# Patient Record
Sex: FEMALE | Race: WHITE | NOT HISPANIC OR LATINO | Employment: OTHER | URBAN - METROPOLITAN AREA
[De-identification: names, ages, dates, MRNs, and addresses within clinical notes are randomized per-mention and may not be internally consistent; named-entity substitution may affect disease eponyms.]

---

## 2021-01-15 ENCOUNTER — IMMUNIZATION (OUTPATIENT)
Dept: INTERNAL MEDICINE | Facility: CLINIC | Age: 76
End: 2021-01-15
Payer: OTHER GOVERNMENT

## 2021-01-15 DIAGNOSIS — Z23 NEED FOR VACCINATION: Primary | ICD-10-CM

## 2021-01-15 PROCEDURE — 91300 COVID-19, MRNA, LNP-S, PF, 30 MCG/0.3 ML DOSE VACCINE: CPT | Mod: ,,, | Performed by: FAMILY MEDICINE

## 2021-01-15 PROCEDURE — 0001A COVID-19, MRNA, LNP-S, PF, 30 MCG/0.3 ML DOSE VACCINE: ICD-10-PCS | Mod: CV19,,, | Performed by: FAMILY MEDICINE

## 2021-01-15 PROCEDURE — 0001A COVID-19, MRNA, LNP-S, PF, 30 MCG/0.3 ML DOSE VACCINE: CPT | Mod: CV19,,, | Performed by: FAMILY MEDICINE

## 2021-01-15 PROCEDURE — 91300 COVID-19, MRNA, LNP-S, PF, 30 MCG/0.3 ML DOSE VACCINE: ICD-10-PCS | Mod: ,,, | Performed by: FAMILY MEDICINE

## 2021-02-03 ENCOUNTER — TELEPHONE (OUTPATIENT)
Dept: FAMILY MEDICINE | Facility: CLINIC | Age: 76
End: 2021-02-03

## 2021-02-05 ENCOUNTER — IMMUNIZATION (OUTPATIENT)
Dept: INTERNAL MEDICINE | Facility: CLINIC | Age: 76
End: 2021-02-05
Payer: OTHER GOVERNMENT

## 2021-02-05 DIAGNOSIS — Z23 NEED FOR VACCINATION: Primary | ICD-10-CM

## 2021-02-05 PROCEDURE — 91300 COVID-19, MRNA, LNP-S, PF, 30 MCG/0.3 ML DOSE VACCINE: CPT | Mod: PBBFAC,PO

## 2021-02-05 PROCEDURE — 0002A COVID-19, MRNA, LNP-S, PF, 30 MCG/0.3 ML DOSE VACCINE: CPT | Mod: PBBFAC,PO

## 2021-08-23 ENCOUNTER — IMMUNIZATION (OUTPATIENT)
Dept: INTERNAL MEDICINE | Facility: CLINIC | Age: 76
End: 2021-08-23
Payer: OTHER GOVERNMENT

## 2021-08-23 DIAGNOSIS — Z23 NEED FOR VACCINATION: Primary | ICD-10-CM

## 2021-08-23 PROCEDURE — 91300 COVID-19, MRNA, LNP-S, PF, 30 MCG/0.3 ML DOSE VACCINE: CPT | Mod: PBBFAC

## 2021-08-23 PROCEDURE — 0003A COVID-19, MRNA, LNP-S, PF, 30 MCG/0.3 ML DOSE VACCINE: CPT | Mod: PBBFAC

## 2023-05-26 ENCOUNTER — OFFICE VISIT (OUTPATIENT)
Dept: CARDIOLOGY | Facility: CLINIC | Age: 78
End: 2023-05-26
Payer: COMMERCIAL

## 2023-05-26 ENCOUNTER — TELEPHONE (OUTPATIENT)
Dept: CARDIOLOGY | Facility: HOSPITAL | Age: 78
End: 2023-05-26
Payer: COMMERCIAL

## 2023-05-26 VITALS
BODY MASS INDEX: 21.81 KG/M2 | HEIGHT: 70 IN | SYSTOLIC BLOOD PRESSURE: 126 MMHG | DIASTOLIC BLOOD PRESSURE: 75 MMHG | HEART RATE: 75 BPM | WEIGHT: 152.31 LBS

## 2023-05-26 DIAGNOSIS — I50.22 CHRONIC SYSTOLIC CONGESTIVE HEART FAILURE: Primary | ICD-10-CM

## 2023-05-26 DIAGNOSIS — E78.00 PURE HYPERCHOLESTEROLEMIA: ICD-10-CM

## 2023-05-26 DIAGNOSIS — Z95.0 CARDIAC RESYNCHRONIZATION THERAPY PACEMAKER (CRT-P) IN PLACE: ICD-10-CM

## 2023-05-26 DIAGNOSIS — I12.9 BENIGN HYPERTENSION WITH CHRONIC KIDNEY DISEASE: ICD-10-CM

## 2023-05-26 DIAGNOSIS — I45.9 HEART BLOCK: ICD-10-CM

## 2023-05-26 DIAGNOSIS — Z95.0 CARDIAC PACEMAKER IN SITU: Primary | ICD-10-CM

## 2023-05-26 DIAGNOSIS — I25.10 CORONARY ARTERY DISEASE INVOLVING NATIVE CORONARY ARTERY OF NATIVE HEART WITHOUT ANGINA PECTORIS: ICD-10-CM

## 2023-05-26 PROBLEM — G50.0 TRIGEMINAL NEURALGIA: Status: ACTIVE | Noted: 2023-05-26

## 2023-05-26 PROBLEM — I51.89 DIASTOLIC DYSFUNCTION: Status: ACTIVE | Noted: 2022-07-14

## 2023-05-26 PROBLEM — I49.8: Status: ACTIVE | Noted: 2022-10-21

## 2023-05-26 PROBLEM — J30.9 ALLERGIC RHINITIS: Status: ACTIVE | Noted: 2023-05-26

## 2023-05-26 PROBLEM — I44.7 LBBB (LEFT BUNDLE BRANCH BLOCK): Status: ACTIVE | Noted: 2022-10-21

## 2023-05-26 PROBLEM — M81.0 OSTEOPOROSIS: Status: ACTIVE | Noted: 2023-05-26

## 2023-05-26 PROBLEM — H35.30 MACULAR DEGENERATION: Status: ACTIVE | Noted: 2023-05-26

## 2023-05-26 PROBLEM — I34.0 MITRAL VALVE INSUFFICIENCY: Status: ACTIVE | Noted: 2022-06-15

## 2023-05-26 PROBLEM — N18.2 CKD (CHRONIC KIDNEY DISEASE) STAGE 2, GFR 60-89 ML/MIN: Status: ACTIVE | Noted: 2022-06-15

## 2023-05-26 PROBLEM — E78.5 HYPERLIPIDEMIA: Status: ACTIVE | Noted: 2023-05-26

## 2023-05-26 PROBLEM — I50.9 CHF (CONGESTIVE HEART FAILURE): Status: ACTIVE | Noted: 2022-12-02

## 2023-05-26 PROBLEM — G47.00 INSOMNIA: Status: ACTIVE | Noted: 2023-02-14

## 2023-05-26 PROBLEM — I50.32 CHRONIC DIASTOLIC HEART FAILURE: Status: ACTIVE | Noted: 2022-06-15

## 2023-05-26 PROBLEM — M19.049 OSTEOARTHRITIS OF HAND: Status: ACTIVE | Noted: 2023-05-26

## 2023-05-26 PROCEDURE — 1160F RVW MEDS BY RX/DR IN RCRD: CPT | Mod: CPTII,S$GLB,, | Performed by: INTERNAL MEDICINE

## 2023-05-26 PROCEDURE — 3078F PR MOST RECENT DIASTOLIC BLOOD PRESSURE < 80 MM HG: ICD-10-PCS | Mod: CPTII,S$GLB,, | Performed by: INTERNAL MEDICINE

## 2023-05-26 PROCEDURE — 93000 EKG 12-LEAD: ICD-10-PCS | Mod: S$GLB,,, | Performed by: INTERNAL MEDICINE

## 2023-05-26 PROCEDURE — 1101F PR PT FALLS ASSESS DOC 0-1 FALLS W/OUT INJ PAST YR: ICD-10-PCS | Mod: CPTII,S$GLB,, | Performed by: INTERNAL MEDICINE

## 2023-05-26 PROCEDURE — 99999 PR PBB SHADOW E&M-EST. PATIENT-LVL III: ICD-10-PCS | Mod: PBBFAC,,, | Performed by: INTERNAL MEDICINE

## 2023-05-26 PROCEDURE — 99204 PR OFFICE/OUTPT VISIT, NEW, LEVL IV, 45-59 MIN: ICD-10-PCS | Mod: 25,S$GLB,, | Performed by: INTERNAL MEDICINE

## 2023-05-26 PROCEDURE — 3074F PR MOST RECENT SYSTOLIC BLOOD PRESSURE < 130 MM HG: ICD-10-PCS | Mod: CPTII,S$GLB,, | Performed by: INTERNAL MEDICINE

## 2023-05-26 PROCEDURE — 1159F PR MEDICATION LIST DOCUMENTED IN MEDICAL RECORD: ICD-10-PCS | Mod: CPTII,S$GLB,, | Performed by: INTERNAL MEDICINE

## 2023-05-26 PROCEDURE — 93000 ELECTROCARDIOGRAM COMPLETE: CPT | Mod: S$GLB,,, | Performed by: INTERNAL MEDICINE

## 2023-05-26 PROCEDURE — 3288F FALL RISK ASSESSMENT DOCD: CPT | Mod: CPTII,S$GLB,, | Performed by: INTERNAL MEDICINE

## 2023-05-26 PROCEDURE — 1159F MED LIST DOCD IN RCRD: CPT | Mod: CPTII,S$GLB,, | Performed by: INTERNAL MEDICINE

## 2023-05-26 PROCEDURE — 3074F SYST BP LT 130 MM HG: CPT | Mod: CPTII,S$GLB,, | Performed by: INTERNAL MEDICINE

## 2023-05-26 PROCEDURE — 99999 PR PBB SHADOW E&M-EST. PATIENT-LVL III: CPT | Mod: PBBFAC,,, | Performed by: INTERNAL MEDICINE

## 2023-05-26 PROCEDURE — 99204 OFFICE O/P NEW MOD 45 MIN: CPT | Mod: 25,S$GLB,, | Performed by: INTERNAL MEDICINE

## 2023-05-26 PROCEDURE — 3078F DIAST BP <80 MM HG: CPT | Mod: CPTII,S$GLB,, | Performed by: INTERNAL MEDICINE

## 2023-05-26 PROCEDURE — 1126F AMNT PAIN NOTED NONE PRSNT: CPT | Mod: CPTII,S$GLB,, | Performed by: INTERNAL MEDICINE

## 2023-05-26 PROCEDURE — 1126F PR PAIN SEVERITY QUANTIFIED, NO PAIN PRESENT: ICD-10-PCS | Mod: CPTII,S$GLB,, | Performed by: INTERNAL MEDICINE

## 2023-05-26 PROCEDURE — 1101F PT FALLS ASSESS-DOCD LE1/YR: CPT | Mod: CPTII,S$GLB,, | Performed by: INTERNAL MEDICINE

## 2023-05-26 PROCEDURE — 3288F PR FALLS RISK ASSESSMENT DOCUMENTED: ICD-10-PCS | Mod: CPTII,S$GLB,, | Performed by: INTERNAL MEDICINE

## 2023-05-26 PROCEDURE — 1160F PR REVIEW ALL MEDS BY PRESCRIBER/CLIN PHARMACIST DOCUMENTED: ICD-10-PCS | Mod: CPTII,S$GLB,, | Performed by: INTERNAL MEDICINE

## 2023-05-26 RX ORDER — PANTOPRAZOLE SODIUM 40 MG/1
TABLET, DELAYED RELEASE ORAL
COMMUNITY
Start: 2022-11-28 | End: 2023-05-26

## 2023-05-26 RX ORDER — AZITHROMYCIN 250 MG/1
TABLET, FILM COATED ORAL
COMMUNITY
Start: 2023-02-15 | End: 2023-05-26

## 2023-05-26 RX ORDER — METOPROLOL SUCCINATE 25 MG/1
25 TABLET, EXTENDED RELEASE ORAL DAILY
COMMUNITY
Start: 2023-03-22 | End: 2023-09-12

## 2023-05-26 RX ORDER — DEXBROMPHENIRAMINE MALEATE AND PHENYLEPHRINE HYDROCHLORIDE 2; 10 MG/1; MG/1
TABLET ORAL
COMMUNITY
Start: 2023-02-15 | End: 2023-05-26

## 2023-05-26 RX ORDER — CIPROFLOXACIN 250 MG/1
TABLET, FILM COATED ORAL
COMMUNITY
Start: 2022-12-22 | End: 2023-05-26

## 2023-05-26 RX ORDER — SACUBITRIL AND VALSARTAN 24; 26 MG/1; MG/1
0.5 TABLET, FILM COATED ORAL 2 TIMES DAILY
COMMUNITY
Start: 2023-03-22

## 2023-05-26 RX ORDER — IBANDRONATE SODIUM 150 MG/1
150 TABLET, FILM COATED ORAL
COMMUNITY
Start: 2023-04-13

## 2023-05-26 RX ORDER — QUETIAPINE FUMARATE 25 MG/1
TABLET, FILM COATED ORAL
COMMUNITY
Start: 2023-04-23 | End: 2023-05-26

## 2023-05-26 RX ORDER — ATORVASTATIN CALCIUM 40 MG/1
40 TABLET, FILM COATED ORAL DAILY
COMMUNITY
Start: 2023-03-15

## 2023-05-26 RX ORDER — EMPAGLIFLOZIN 10 MG/1
5 TABLET, FILM COATED ORAL DAILY
COMMUNITY
Start: 2023-04-25

## 2023-05-26 NOTE — TELEPHONE ENCOUNTER
Spoke with patient and scheduled her to come in on 05/31/2023 to have her device checked for diaphragmatic stimulation.  Patient stated she is okay and can wait until 05/31/2023.

## 2023-05-26 NOTE — PROGRESS NOTES
"Subjective:   05/26/2023     Patient ID:  Ilene Mora is a 78 y.o. female who presents for evaulation of Hyperlipidemia and s/p Pacemaker      Comes in for cardiac follow-up.  She had been followed by Dr. Carson.  I would put her pacemaker in 10 years ago.  She had had complete heart block.  Due to right ventricular pacing, she developed a dilated cardiomyopathy and 3 months ago underwent upgrade to a biventricular pacemaker.  She is since noted pain around the pacemaker.  There has been no fever chills, no redness in the site.  Echocardiography subsequent to pacemaker implantation showed ejection fraction improvement to 45-50% in March 2023.  She is on guideline directed medical therapy for systolic heart failure including metoprolol, Entresto and Jardiance.  She does feel fatigued on this medication regimen.      She does note occasional diaphragmatic stimulation from the pacemaker.    She does not have coronary artery disease, cardiac catheterization shows normal coronary arteries in 2021.    Hypertension appears to be well controlled on guideline directed medical therapy.      Hypercholesterolemia is treated with high-dose statin therapy.      Last Filed Vital Signs  - documented in this encounter  Last Filed Vital Signs  Vital Sign Reading Time Taken Comments  Blood Pressure 118/66 01/12/2023 12:59 PM CST    Pulse 62 01/12/2023 12:59 PM CST    Temperature - -    Respiratory Rate - -    Oxygen Saturation 98% 01/12/2023 12:59 PM CST    Inhaled Oxygen Concentration - -    Weight 71.8 kg (158 lb 6.4 oz) 01/12/2023 12:59 PM CST    Height 180.3 cm (5' 11") 01/12/2023 12:59 PM CST    Body Mass Index 22.09 01/12/2023 12:59 PM CST      Progress Notes  - documented in this encounter  Minda Carson MD - 01/12/2023 1:00 PM CST  Formatting of this note is different from the original.  CARDIOLOGY CLINIC NOTE   From 35% to  Date of Service:   01/12/23    Chief Complaint:  Ilene Mora is a 77 y.o. who presents to " clinic for follow up    History of Present Illness:  Ilene Mora presents today for routine follow up.     Ilene Mora presents today for routine follow up. With h/o chb/sp At which time she presented with ex intolerance Link LH and near syncope PPM .DD MR and LBBB chf ref      She had symtomatic complete heart block in  s/p PPM   In 2021 she had l  That led to pharm ett and cardiac echo showed DD, and mild MR  She then had cath 3/2021 No sig obstructive disease   Her patricia pep was 221   She then had sx of chf with link fatigue and Ex intolerance  t  \she had echo  with ef of 35 % secondary to lv dyschrony and rv pacing   bnp 2022 was 53  Then seen in chf clinic to initiate EBGMT On bb ARNI And tolerating meds   This led to Dual chamber BI ppm in 2022 as she had > 80 % rv pacing For LV dyschrony, LBBB chf an d worsening lv fx    She has been feeling so much better Stamina improved  No sx of link, pnd palpitations     PPM bi interrogated 2023nl threwholds  Batter > 9 yr nl fx     EKG today v paced        Past Medical History     Past Medical History:   Diagnosis Date   · Age-related macular degeneration   bilat   · Arthritis   hands   · Cardiac dysrhythmia   · Cataract   · Esophageal stricture   · GERD (gastroesophageal reflux disease)   · Heart block   · HLD (hyperlipidemia)   · Hypotension   · Mitral regurgitation   · Osteopenia   · Other seasonal allergic rhinitis   · Pacemaker   · SOB (shortness of breath)   · Trigeminal neuralgia     Past Surgical History:   Procedure Laterality Date   · CATARACT EXTRACTION, BILATERAL   ·  SECTION, CLASSIC   · CHOLECYSTECTOMY      · COLONOSCOPY   · DIAGNOSTIC EPS N/A 2022   Procedure: Implantation of LV-Lead  MEDTRONIC; Surgeon: Romero Burch MD; Location: [facility]; Service: Cardiology; Laterality: N/A;   · PACEMAKER INSERTION   · PARTIAL HYSTERECTOMY   · TONSILLECTOMY     Allergies   Allergen Reactions   ·  "Sulfadiazine Rash and Swelling   · Prednisone Other (See Comments)   Not sure reaction, possibly fainted     Family History   Problem Relation Age of Onset   · Diabetes Mother   · Heart failure Mother   · Coronary art dis Father   · Cancer Father   · Hypertension Sister   · Osteoarthritis Sister   · Parkinsonism Sister   · Hypertension Brother   · Breast cancer Maternal Grandmother 55   passed 65     Review of Systems:  Constitutional: fatigue no fever  Skin: no rash. bruising, change in nail color  Eyes: no recent vision problems or eye pain no diplopia.  ENT:[occasional congestion,no ear pain, or sore throat.  Endocrine: no unusual hair growth, or intolerance to cold  Cardiovascular: as above]  Respiratory: occasional cough, occ shortness of breath, no wheezing  Gastrointestinal: no abdoinal pain, nausea, vomiting, or diarrhea. occ dyspepsia  Genitourinary: no dysuria.hematuria, polyuria  Musculoskeletal: occasional myalgias, arthralgias  Neurologic: no headache migraines,numbness  Hematologic: no unusual bruising or bleeding.  Psychiatric: anxiety depression   :  PHYSICAL EXAM  Vitals:   01/12/23 1259   BP: 118/66   Pulse: 62   SpO2: 98%   Weight: 71.8 kg (158 lb 6.4 oz)   Height: 1.803 m (5' 11")     Patient Vitals for the past 24 hrs:  Height Weight   01/12/23 1259 1.803 m (5' 11") 71.8 kg (158 lb 6.4 oz)     General well developed NAD  Head normal no sign of trauma  Eyes pupil equal no icterus  Mouth normal mucosa  Neck supple no jvd, carotid normal  Respiratory lungs clear bilaterally  Cardiac RRR murmur   Pulses normal   Abd soft non tender BS normal  Muscularskeletal normal range of motion , no edema  Neurological Alert oriented no deficets  Psychiatry mood normal coorperative  Skin no rash or lesions     Lab Results:  Lab Results   Component Value Date   CREATININE 0.83 12/02/2022   CREATININE 0.88 10/26/2022   CREATININE 0.97 (H) 06/09/2022    12/02/2022    10/26/2022    06/09/2022   K " 4.0 2022   K 4.3 10/26/2022   K 4.7 2022    2022    10/26/2022    2022   CO2 33 (H) 2022   CO2 27 10/26/2022   CO2 26 2022   BUN 14.0 2022   BUN 22 10/26/2022   BUN 18 2022     Lab Results   Component Value Date   AST 20 2022   AST 16 2022   ALT 17 2022   ALT 15 2022     Lab Results   Component Value Date   WBC 7.4 2022   WBC 5.3 2022   HGB 12.0 2022   HGB 12.5 2022   MCV 93.7 2022    2022    2022     No results found for: CHOL, HDL, LDL, TRIG, NONHDLC, LDLCALC, LDLDIRECT, LDLHDL, CHOLHDL]  Lab Results   Component Value Date   BNP 53 2022     Diagnostic Studies:  Reviewed by me .      2022  RECOMMENDATIONS:   Status post Medtronic Bi V pacemaker upgrade for complete heart block with congestive heart failure and worsening LV systolic function    No results found for this or any previous visit (from the past 4464 hour(s)).    Encounter Date: 22   Transthoracic Echocardiogram (TTE) Complete   Narrative   MMode/2D Measurements   IVSd: 0.99 cm LVIDd: 3.9 cm MVA(traced): 2.4 cm2  LVIDs: 2.7 cm  LVPWd: 0.90 cm  _________________________________________________________________________________  Ao root diam: 2.3 cm LVOT diam: 1.9 cm    Ao root area: 4.1 cm2 LVOT area: 2.9 cm2  LA dimension: 3.3 cm    Doppler Measurements   MV E max anjelica: 53.3 cm/sec MV dec slope: 188.4 cm/sec2 Ao V2 max: 139.8 cm/sec  MV A max anjelica: 85.8 cm/sec MV dec time: 0.31 sec Ao max P.8 mmHg  MV E/A: 0.62 Ao mean P.7 mmHg  Ao V2 VTI: 31.9 cm  MARYAN(I,D): 1.9 cm2    MARYAN(V,A): 1.9 cm2  MARYAN(V,D): 1.9 cm2    _________________________________________________________________________________  LV V1 max PG: 3.4 mmHg MR max anjelica: 456.3 cm/sec  AI dec slope: 180.4 cm/sec2 LV V1 mean P.7 mmHg MR max P.3 mmHg  LV V1 max: 92.6 cm/sec  LV V1 mean: 59.1 cm/sec  LV V1 VTI: 20.6  cm    _________________________________________________________________________________  SV(LVOT): 59.4 ml TR max jens: 203.3 cm/sec Lat Peak E' Jens: 5.1 cm/sec  Dimensionless Index: 0.64 TR max P.0 mmHg    _________________________________________________________________________________  E/E' Lateral: 10.4    Procedure Details:  A two-dimensional transthoracic echocardiogram with color flow and Doppler was performed. The study  was technically good with many images being of high quality.    Left Ventricle:  Left ventricular walls are upper limits of normal for size. Abnormal (paradoxical) septal motion  consistent with RV pacemaker . lv dyschrony. EF approx 35 %. Grade I diastolic dysfunction,  (abnormal relaxation pattern).    Left Atrium/Atrial Septum:  The left atrium is moderately dilated. An interatrial septal aneurysm is suggested.    Right Atrium:  There is a catheter/pacemaker lead seen in the RA cavity. The right atrium is mild to moderately  dilated.    Right Ventricle:  The right ventricle is normal in size and function. There is a pacemaker lead in the right  ventricle. There is no mass or thrombus in the right ventricle.    Aortic Valve:  There is mild aortic sclerosis. There is no aortic valvular vegetation. Mild aortic regurgitation.    Mitral Valve:  The mitral valve is normal in structure and function. There is no vegetation seen on the mitral  valve. There is mild to moderate mitral regurgitation.    Tricuspid Valve:  Anatomically normal tricuspid valve. There is no tricuspid valve vegetation. There is mild tricuspid  regurgitation.    Pulmonic Valve:  The pulmonic valve is not well visualized. Mild pulmonic valvular regurgitation.    Arteries:  The aortic root is normal size.    Venous:  IVC partially collapsed with inspiration, can't exclude elevated right sided pressures.    Pericardium/Pleura:  There is no pericardial effusion.    Interpretation Summary  An interatrial septal aneurysm is  suggested.  IVC partially collapsed with inspiration, can't exclude elevated right sided pressures.  There is no aortic valvular vegetation.  There is no vegetation seen on the mitral valve.  There is no tricuspid valve vegetation.  The pulmonic valve is not well visualized.  There is a pacemaker lead in the right ventricle.  There is no mass or thrombus in the right ventricle.  There is mild to moderate mitral regurgitation.  There is mild aortic sclerosis.  The left atrium is moderately dilated.  The right atrium is mild to moderately dilated.  Mild aortic regurgitation.  Grade I diastolic dysfunction, (abnormal relaxation pattern).  lv dyschrony  EF approx 35 %    ______________________________________________________________________________  Electronically signed by: Joanna Carson MD 11/03/2022 10:16 AM  Ordering Physician: JOANNA CARSON  Referring Physician: JOANNA CARSON  Performed By: Jessica Johnson        Current Medications:  Current Outpatient Medications on File Prior to Visit   Medication Sig Dispense Refill   · atorvastatin (LIPITOR) 40 MG tablet TAKE 1 TABLET EVERY DAY 90 tablet 0   · ENTRESTO 24-26 mg Tab tablet Take 1 tablet daily by mouth   · ibandronate (BONIVA) 150 mg tablet TAKE 1 TABLET EVERY MONTH. DO NOT LIE DOWN FOR AT LEAST 1 HOUR 3 tablet 0   · JARDIANCE 10 mg Tab tablet TAKE 1 TABLET EVERY MORNING 30 tablet 0   · metoprolol succinate (TOPROL XL) 25 MG 24 hr tablet TAKE 1 TABLET EVERY DAY 90 tablet 1   · vit A/vit C/vit E/zinc/copper (ICAPS AREDS ORAL) Take by mouth     No current facility-administered medications on file prior to visit.     PROBLEM LIST:     Encounter Diagnoses   Name Primary?   · Left ventricular dyssynchrony   · Nonrheumatic mitral valve regurgitation   · Pacemaker   · LBBB (left bundle branch block)   · Chronic systolic congestive heart failure (CMS/HCC)   · Stage 3a chronic kidney disease (CMS/HCC)   · Coronary artery disease involving native coronary artery  of native heart without angina pectoris     PLAN:     Name Primary?   · Left ventricular dyssynchrony  S/p bi PPM   · Nonrheumatic mitral valve regurgitation  monito   · Pacemaker   · LBBB (left bundle branch block)   · Chronic systolic congestive heart failure (CMS/HCC) c  clinically improved   tolerting meds And ed ordered   · Stage 3a chronic kidney disease (CMS/HCC)   · Coronary artery disease involving native coronary artery of native heart without angina pectoris     Prevention    Preventive education  For smokers: Educated and strongly counseled on smoking cessation.   Adopt a heart healthy diet  Counseled on various heart healthy diets  -Mediterranean diet- High in fruits vegetable, grains, fish, nuts and extra-virgin olive oil. Minimize processed, salty foods, packaged foods.  -Dash diet to lower HTN-Similar to mediterranean diet, low fat dairy, meatless meals and low sodium  -Plant based diet: Foundation being minimally processed and whole foods.   -Avoid foods in saturated fats and trans fats.   Aerobic exercise 30 minutes 5 times/ week.   Reduce and limit alcohol intake.   Learn stress management / relaxation techniques such as mindfulness and meditation.   Encourage self and family members to adopt healthy choices at a young age.          MMode/2D Measurements   IVSd: 0.95 cm                       LVIDd: 5.0 cm                 MVA(traced): 2.7 cm2                                       LVIDs: 3.5 cm                                       LVPWd: 1.2 cm             _________________________________________________________________________________   Ao root diam: 2.4 cm                LVOT diam: 2.3 cm   Ao root area: 4.4 cm2               LVOT area: 4.3 cm2   LA dimension: 3.8 cm     Doppler Measurements   MV E max anjelica: 56.1 cm/sec                                             Ao V2 max: 112.4 cm/sec   MV A max anjelica: 76.9 cm/sec           MV dec slope: 198.9 cm/sec2       Ao max P.1 mmHg   MV E/A: 0.73                         MV dec time: 0.28 sec             Ao V2 mean: 75.6 cm/sec                                                                         Ao mean P.6 mmHg                                                                         Ao V2 VTI: 23.4 cm               _________________________________________________________________________________                                       MR max jens: 460.4 cm/sec          TR max jens: 212.8 cm/sec   AI dec slope: 109.1 cm/sec2         MR max P.9 mmHg              TR max P.2 mmHg               _________________________________________________________________________________   Lat Peak E' Jens: 11.0 cm/sec        E/E' Lateral: 5.1     Procedure Details:   A two-dimensional transthoracic echocardiogram with color flow and Doppler was performed. The study   was technically good with many images being of high quality.     Left Ventricle:   The left ventricular size, thickness and function are normal. Abnormal (paradoxical) septal motion   consistent with RV pacemaker . Ejection Fraction = 45-50%. Left ventricular systolic function is   mildly reduced. Grade I diastolic dysfunction, (abnormal relaxation pattern).     Left Atrium/Atrial Septum:   The left atrial size is normal.     Right Atrium:   Right atrial size is normal.     Right Ventricle:   The right ventricle is normal in size and function. There is a pacemaker lead in the right   ventricle. The right ventricular systolic function is normal.     Aortic Valve:   Anatomically normal aortic valve. No hemodynamically significant valvular aortic stenosis. Trace   (trivial) aortic regurgitation.     Mitral Valve:   The mitral valve is normal in structure and function. There is mild mitral regurgitation.     Tricuspid Valve:   Anatomically normal tricuspid valve. Peak PA systolic pressure is normal. There is mild tricuspid   regurgitation.     Pulmonic Valve:   Anatomically normal pulmonic valve. There is no  pulmonic valvular regurgitation.     Arteries:   The aortic root is normal size.     Venous:   The inferior vena cava is normal in size, with a normal collapsibility index.     Pericardium/Pleura:   There is no pericardial effusion.     Interpretation Summary   Left ventricular systolic function is mildly reduced.   Ejection Fraction = 45-50%.   Grade I diastolic dysfunction, (abnormal relaxation pattern).   Abnormal (paradoxical) septal motion consistent with RV pacemaker .   Trace (trivial) aortic regurgitation.   There is mild mitral regurgitation.   There is mild tricuspid regurgitation.   The inferior vena cava is normal in size, with a normal collapsibility index.   ______________________________________________________________________________   Electronically signed by:               Ala Mohsen 03/31/2023 03:50 PM   Ordering Physician: Minda Carson MD   Referring Physician: Minda Carson MD   Performed By: Maryellen Haines  Procedure Note    Mohsen, Ala Mousa, MD - 03/31/2023   Formatting of this note might be different from the original.          History reviewed. No pertinent past medical history.    Review of patient's allergies indicates:   Allergen Reactions    Sulfadiazine      Other reaction(s): flushed, swelling         Current Outpatient Medications:     atorvastatin (LIPITOR) 40 MG tablet, Take 40 mg by mouth once daily., Disp: , Rfl:     ENTRESTO 24-26 mg per tablet, Take 1 tablet by mouth 2 (two) times daily., Disp: , Rfl:     ibandronate (BONIVA) 150 mg tablet, Take 150 mg by mouth every 30 days., Disp: , Rfl:     JARDIANCE 10 mg tablet, Take 10 mg by mouth once daily., Disp: , Rfl:     metoprolol succinate (TOPROL-XL) 25 MG 24 hr tablet, Take 25 mg by mouth once daily., Disp: , Rfl:     vit A/vit C/vit E/zinc/copper (ICAPS AREDS ORAL), Take by mouth Daily., Disp: , Rfl:      Objective:   Review of Systems   Cardiovascular:  Negative for chest pain, claudication, cyanosis, dyspnea on  exertion, irregular heartbeat, leg swelling, near-syncope, orthopnea, palpitations, paroxysmal nocturnal dyspnea and syncope.       Vitals:    05/26/23 1100   BP: 126/75   Pulse: 75     Wt Readings from Last 3 Encounters:   05/26/23 69.1 kg (152 lb 5.4 oz)     Temp Readings from Last 3 Encounters:   No data found for Temp     BP Readings from Last 3 Encounters:   05/26/23 126/75     Pulse Readings from Last 3 Encounters:   05/26/23 75             Physical Exam  Vitals reviewed.   Constitutional:       General: She is not in acute distress.     Appearance: She is well-developed.   HENT:      Head: Normocephalic and atraumatic.      Nose: Nose normal.   Eyes:      Conjunctiva/sclera: Conjunctivae normal.      Pupils: Pupils are equal, round, and reactive to light.   Neck:      Vascular: No JVD.   Cardiovascular:      Rate and Rhythm: Normal rate and regular rhythm.      Pulses: Intact distal pulses.      Heart sounds: Normal heart sounds. No murmur heard.    No friction rub. No gallop.   Pulmonary:      Effort: Pulmonary effort is normal. No respiratory distress.      Breath sounds: Normal breath sounds. No wheezing or rales.   Chest:      Chest wall: No tenderness.   Abdominal:      General: Bowel sounds are normal. There is no distension.      Palpations: Abdomen is soft.      Tenderness: There is no abdominal tenderness.   Musculoskeletal:         General: No tenderness or deformity. Normal range of motion.        Arms:       Cervical back: Normal range of motion and neck supple.      Right lower leg: No edema.      Left lower leg: No edema.   Skin:     General: Skin is warm and dry.      Findings: No erythema or rash.   Neurological:      Mental Status: She is alert and oriented to person, place, and time.      Cranial Nerves: No cranial nerve deficit.      Motor: No abnormal muscle tone.      Coordination: Coordination normal.   Psychiatric:         Behavior: Behavior normal.         Thought Content: Thought  content normal.         Judgment: Judgment normal.         No results found for: CHOL  No results found for: HDL  No results found for: LDLCALC  No results found for: ALT, AST  No results found for: CREATININE, BUN, NA, K, CO2  No results found for: HGB, HCT            EKG shows sinus rhythm with biventricular paced rhythm          Assessment and Plan:     Chronic systolic congestive heart failure  Comments:  Currently compensated with improved left ventricular function, poorly tolerant of guideline directed medical therapy, decrease Jardiance  Orders:  -     Echo; Future; Expected date: 08/26/2023  -     EKG 12-lead    Coronary artery disease involving native coronary artery of native heart without angina pectoris  Comments:  No angina    Heart block    Pure hypercholesterolemia  Comments:  On high-dose statin therapy    Cardiac resynchronization therapy pacemaker (CRT-P) in place  Comments:  Enroll in pacemaker clinic  Orders:  -     IN OFFICE EKG 12-LEAD (to [facility])    Benign hypertension with chronic kidney disease  Comments:  Well controlled         Follow up in about 3 months (around 8/26/2023).          No future appointments.

## 2023-05-26 NOTE — TELEPHONE ENCOUNTER
Called patient to schedule an appointment to have her device checked for diaphragmatic stimulation.  I was informed that she is not home at this time.  Left my name and number for patient to return my call so I can schedule her.

## 2023-05-31 ENCOUNTER — CLINICAL SUPPORT (OUTPATIENT)
Dept: CARDIOLOGY | Facility: HOSPITAL | Age: 78
End: 2023-05-31
Attending: INTERNAL MEDICINE
Payer: COMMERCIAL

## 2023-05-31 DIAGNOSIS — Z95.0 CARDIAC PACEMAKER IN SITU: ICD-10-CM

## 2023-05-31 PROCEDURE — 93281 CARDIAC DEVICE CHECK - IN CLINIC & HOSPITAL: ICD-10-PCS | Mod: 26,,, | Performed by: INTERNAL MEDICINE

## 2023-05-31 PROCEDURE — 93281 PM DEVICE PROGR EVAL MULTI: CPT

## 2023-05-31 PROCEDURE — 93281 PM DEVICE PROGR EVAL MULTI: CPT | Mod: 26,,, | Performed by: INTERNAL MEDICINE

## 2023-07-11 ENCOUNTER — TELEPHONE (OUTPATIENT)
Dept: CARDIOLOGY | Facility: CLINIC | Age: 78
End: 2023-07-11
Payer: COMMERCIAL

## 2023-07-11 NOTE — TELEPHONE ENCOUNTER
----- Message from Yari Armas sent at 7/11/2023 10:51 AM CDT -----  Pt Requesting Call Back    Who called: pt  Who called for pt:  Best call back #: [phone]  Add notes:

## 2023-08-22 ENCOUNTER — HOSPITAL ENCOUNTER (OUTPATIENT)
Dept: CARDIOLOGY | Facility: HOSPITAL | Age: 78
Discharge: HOME | End: 2023-08-22
Attending: INTERNAL MEDICINE
Payer: COMMERCIAL

## 2023-08-22 VITALS
BODY MASS INDEX: 21.76 KG/M2 | WEIGHT: 152 LBS | HEIGHT: 70 IN | HEART RATE: 70 BPM | DIASTOLIC BLOOD PRESSURE: 70 MMHG | SYSTOLIC BLOOD PRESSURE: 112 MMHG

## 2023-08-22 DIAGNOSIS — I50.22 CHRONIC SYSTOLIC CONGESTIVE HEART FAILURE: ICD-10-CM

## 2023-08-22 PROCEDURE — 93306 ECHO (CUPID ONLY): ICD-10-PCS | Mod: 26,,, | Performed by: INTERNAL MEDICINE

## 2023-08-22 PROCEDURE — 93306 TTE W/DOPPLER COMPLETE: CPT

## 2023-08-22 PROCEDURE — 93306 TTE W/DOPPLER COMPLETE: CPT | Mod: 26,,, | Performed by: INTERNAL MEDICINE

## 2023-08-23 ENCOUNTER — TELEPHONE (OUTPATIENT)
Dept: CARDIOLOGY | Facility: CLINIC | Age: 78
End: 2023-08-23
Payer: COMMERCIAL

## 2023-08-23 ENCOUNTER — TELEPHONE (OUTPATIENT)
Dept: CARDIOLOGY | Facility: HOSPITAL | Age: 78
End: 2023-08-23
Payer: COMMERCIAL

## 2023-08-23 LAB
ASCENDING AORTA: 3.23 CM
AV INDEX (PROSTH): 0.73
AV MEAN GRADIENT: 7 MMHG
AV PEAK GRADIENT: 11 MMHG
AV VALVE AREA BY VELOCITY RATIO: 2.35 CM²
AV VALVE AREA: 2.69 CM²
AV VELOCITY RATIO: 0.64
BSA FOR ECHO PROCEDURE: 1.85 M2
CV ECHO LV RWT: 0.32 CM
DOP CALC AO PEAK VEL: 1.65 M/S
DOP CALC AO VTI: 36.25 CM
DOP CALC LVOT AREA: 3.7 CM2
DOP CALC LVOT DIAMETER: 2.16 CM
DOP CALC LVOT PEAK VEL: 1.06 M/S
DOP CALC LVOT STROKE VOLUME: 97.35 CM3
DOP CALC MV VTI: 25.62 CM
DOP CALCLVOT PEAK VEL VTI: 26.58 CM
E WAVE DECELERATION TIME: 325.9 MSEC
E/A RATIO: 0.83
E/E' RATIO: 8.5 M/S
ECHO LV POSTERIOR WALL: 0.75 CM (ref 0.6–1.1)
FRACTIONAL SHORTENING: 35 % (ref 28–44)
INTERVENTRICULAR SEPTUM: 0.78 CM (ref 0.6–1.1)
LA MAJOR: 4.07 CM
LA MINOR: 4.63 CM
LA WIDTH: 3.47 CM
LEFT ATRIUM SIZE: 3.91 CM
LEFT ATRIUM VOLUME INDEX MOD: 19.6 ML/M2
LEFT ATRIUM VOLUME INDEX: 26.9 ML/M2
LEFT ATRIUM VOLUME MOD: 36.52 CM3
LEFT ATRIUM VOLUME: 49.96 CM3
LEFT INTERNAL DIMENSION IN SYSTOLE: 3 CM (ref 2.1–4)
LEFT VENTRICLE DIASTOLIC VOLUME INDEX: 53.01 ML/M2
LEFT VENTRICLE DIASTOLIC VOLUME: 98.59 ML
LEFT VENTRICLE MASS INDEX: 60 G/M2
LEFT VENTRICLE SYSTOLIC VOLUME INDEX: 18.8 ML/M2
LEFT VENTRICLE SYSTOLIC VOLUME: 35.02 ML
LEFT VENTRICULAR INTERNAL DIMENSION IN DIASTOLE: 4.63 CM (ref 3.5–6)
LEFT VENTRICULAR MASS: 112.5 G
LV LATERAL E/E' RATIO: 7.56 M/S
LV SEPTAL E/E' RATIO: 9.71 M/S
MV A" WAVE DURATION": 10.85 MSEC
MV MEAN GRADIENT: 1 MMHG
MV PEAK A VEL: 0.82 M/S
MV PEAK E VEL: 0.68 M/S
MV PEAK GRADIENT: 3 MMHG
MV VALVE AREA BY CONTINUITY EQUATION: 3.8 CM2
PISA MRMAX VEL: 0.05 M/S
PISA TR MAX VEL: 2.43 M/S
PULM VEIN S/D RATIO: 1.56
PV PEAK D VEL: 0.34 M/S
PV PEAK S VEL: 0.53 M/S
RA MAJOR: 4.95 CM
RA PRESSURE ESTIMATED: 3 MMHG
RA WIDTH: 2.88 CM
RIGHT VENTRICULAR END-DIASTOLIC DIMENSION: 2.88 CM
RV TB RVSP: 5 MMHG
SINUS: 3.11 CM
STJ: 2.98 CM
TDI LATERAL: 0.09 M/S
TDI SEPTAL: 0.07 M/S
TDI: 0.08 M/S
TR MAX PG: 24 MMHG
TRICUSPID ANNULAR PLANE SYSTOLIC EXCURSION: 2.24 CM
TV REST PULMONARY ARTERY PRESSURE: 27 MMHG
Z-SCORE OF LEFT VENTRICULAR DIMENSION IN END DIASTOLE: -1.11
Z-SCORE OF LEFT VENTRICULAR DIMENSION IN END SYSTOLE: -0.49

## 2023-08-23 NOTE — TELEPHONE ENCOUNTER
----- Message from Ranjit Rivera Jr., MD sent at 8/23/2023  8:10 AM CDT -----  Please call, heart strength appears to be excellent, she has had wonderful response to her biventricular pacemaker.    Please let me know if she has any questions   8

## 2023-08-23 NOTE — TELEPHONE ENCOUNTER
Spoke with pt to relay the echo results per Dr. Villanueva. Pt did not have any questions or concerns. CC, RN

## 2023-08-30 ENCOUNTER — TELEPHONE (OUTPATIENT)
Dept: CARDIOLOGY | Facility: CLINIC | Age: 78
End: 2023-08-30
Payer: COMMERCIAL

## 2023-08-30 NOTE — TELEPHONE ENCOUNTER
----- Message from Bonny Zuniga sent at 8/30/2023  8:10 AM CDT -----  Regarding: Waiting List  Good Morning,    Pt is sorry she missed appt yesterday, she got her days mixed up and would also like a callback.   I put pt on waiting list, as well.     Contact @ [phone]    Thanks

## 2023-09-12 ENCOUNTER — OFFICE VISIT (OUTPATIENT)
Dept: CARDIOLOGY | Facility: CLINIC | Age: 78
End: 2023-09-12
Payer: COMMERCIAL

## 2023-09-12 VITALS
DIASTOLIC BLOOD PRESSURE: 79 MMHG | BODY MASS INDEX: 22.28 KG/M2 | WEIGHT: 155.63 LBS | HEIGHT: 70 IN | HEART RATE: 72 BPM | SYSTOLIC BLOOD PRESSURE: 122 MMHG

## 2023-09-12 DIAGNOSIS — I49.8 LEFT VENTRICULAR DYSSYNCHRONY: ICD-10-CM

## 2023-09-12 DIAGNOSIS — I25.10 CORONARY ARTERY DISEASE INVOLVING NATIVE CORONARY ARTERY OF NATIVE HEART WITHOUT ANGINA PECTORIS: ICD-10-CM

## 2023-09-12 DIAGNOSIS — E78.00 PURE HYPERCHOLESTEROLEMIA: ICD-10-CM

## 2023-09-12 DIAGNOSIS — I44.7 LBBB (LEFT BUNDLE BRANCH BLOCK): ICD-10-CM

## 2023-09-12 DIAGNOSIS — Z95.0 CARDIAC RESYNCHRONIZATION THERAPY PACEMAKER (CRT-P) IN PLACE: ICD-10-CM

## 2023-09-12 DIAGNOSIS — I50.22 CHRONIC SYSTOLIC HEART FAILURE: Primary | ICD-10-CM

## 2023-09-12 DIAGNOSIS — I51.89 DIASTOLIC DYSFUNCTION: ICD-10-CM

## 2023-09-12 DIAGNOSIS — I45.9 HEART BLOCK: ICD-10-CM

## 2023-09-12 PROCEDURE — 3288F FALL RISK ASSESSMENT DOCD: CPT | Mod: CPTII,S$GLB,, | Performed by: INTERNAL MEDICINE

## 2023-09-12 PROCEDURE — 99214 OFFICE O/P EST MOD 30 MIN: CPT | Mod: S$GLB,,, | Performed by: INTERNAL MEDICINE

## 2023-09-12 PROCEDURE — 1159F PR MEDICATION LIST DOCUMENTED IN MEDICAL RECORD: ICD-10-PCS | Mod: CPTII,S$GLB,, | Performed by: INTERNAL MEDICINE

## 2023-09-12 PROCEDURE — 3078F PR MOST RECENT DIASTOLIC BLOOD PRESSURE < 80 MM HG: ICD-10-PCS | Mod: CPTII,S$GLB,, | Performed by: INTERNAL MEDICINE

## 2023-09-12 PROCEDURE — 3078F DIAST BP <80 MM HG: CPT | Mod: CPTII,S$GLB,, | Performed by: INTERNAL MEDICINE

## 2023-09-12 PROCEDURE — 99999 PR PBB SHADOW E&M-EST. PATIENT-LVL III: ICD-10-PCS | Mod: PBBFAC,,, | Performed by: INTERNAL MEDICINE

## 2023-09-12 PROCEDURE — 3288F PR FALLS RISK ASSESSMENT DOCUMENTED: ICD-10-PCS | Mod: CPTII,S$GLB,, | Performed by: INTERNAL MEDICINE

## 2023-09-12 PROCEDURE — 1160F PR REVIEW ALL MEDS BY PRESCRIBER/CLIN PHARMACIST DOCUMENTED: ICD-10-PCS | Mod: CPTII,S$GLB,, | Performed by: INTERNAL MEDICINE

## 2023-09-12 PROCEDURE — 1160F RVW MEDS BY RX/DR IN RCRD: CPT | Mod: CPTII,S$GLB,, | Performed by: INTERNAL MEDICINE

## 2023-09-12 PROCEDURE — 1101F PR PT FALLS ASSESS DOC 0-1 FALLS W/OUT INJ PAST YR: ICD-10-PCS | Mod: CPTII,S$GLB,, | Performed by: INTERNAL MEDICINE

## 2023-09-12 PROCEDURE — 99214 PR OFFICE/OUTPT VISIT, EST, LEVL IV, 30-39 MIN: ICD-10-PCS | Mod: S$GLB,,, | Performed by: INTERNAL MEDICINE

## 2023-09-12 PROCEDURE — 1126F PR PAIN SEVERITY QUANTIFIED, NO PAIN PRESENT: ICD-10-PCS | Mod: CPTII,S$GLB,, | Performed by: INTERNAL MEDICINE

## 2023-09-12 PROCEDURE — 1101F PT FALLS ASSESS-DOCD LE1/YR: CPT | Mod: CPTII,S$GLB,, | Performed by: INTERNAL MEDICINE

## 2023-09-12 PROCEDURE — 1159F MED LIST DOCD IN RCRD: CPT | Mod: CPTII,S$GLB,, | Performed by: INTERNAL MEDICINE

## 2023-09-12 PROCEDURE — 3074F SYST BP LT 130 MM HG: CPT | Mod: CPTII,S$GLB,, | Performed by: INTERNAL MEDICINE

## 2023-09-12 PROCEDURE — 3074F PR MOST RECENT SYSTOLIC BLOOD PRESSURE < 130 MM HG: ICD-10-PCS | Mod: CPTII,S$GLB,, | Performed by: INTERNAL MEDICINE

## 2023-09-12 PROCEDURE — 99999 PR PBB SHADOW E&M-EST. PATIENT-LVL III: CPT | Mod: PBBFAC,,, | Performed by: INTERNAL MEDICINE

## 2023-09-12 PROCEDURE — 1126F AMNT PAIN NOTED NONE PRSNT: CPT | Mod: CPTII,S$GLB,, | Performed by: INTERNAL MEDICINE

## 2023-09-12 RX ORDER — METOPROLOL SUCCINATE 25 MG/1
25 TABLET, EXTENDED RELEASE ORAL NIGHTLY
Start: 2023-09-12 | End: 2024-03-25 | Stop reason: SDUPTHER

## 2023-09-12 NOTE — PROGRESS NOTES
"Subjective:   09/12/2023     Patient ID:  Ilene Mora is a 78 y.o. female who presents for evaulation of Congestive Heart Failure, Hypertension, and Hyperlipidemia      Comes in for cardiac follow-up.  She had been followed by Dr. Carson.  I had put her pacemaker in 10 years ago.  She had had complete heart block.  Due to right ventricular pacing, she developed a dilated cardiomyopathy and 3 months ago underwent upgrade to a biventricular pacemaker.  She is since noted pain around the pacemaker.  There has been no fever chills, no redness in the site.  Echocardiography subsequent to pacemaker implantation showed ejection fraction improvement to 45-50% in March 2023.  She is on guideline directed medical therapy for systolic heart failure including metoprolol, Entresto and Jardiance.  She does feel fatigued on this medication regimen.  Repeat echocardiography in 8/23 showed normalization of left ventricular systolic function.  Now tolerating 1/2 Entresto b.i.d., 1/2 a Jardiance daily and off of metoprolol    She had noted occasional diaphragmatic stimulation from the pacemaker, was reprogrammed and now.    She does not have coronary artery disease, cardiac catheterization shows normal coronary arteries in 2021.    Hypertension appears to be well controlled on guideline directed medical therapy.      Hypercholesterolemia is treated with high-dose statin therapy.      Last Filed Vital Signs  - documented in this encounter  Last Filed Vital Signs  Vital Sign Reading Time Taken Comments  Blood Pressure 118/66 01/12/2023 12:59 PM CST    Pulse 62 01/12/2023 12:59 PM CST    Temperature - -    Respiratory Rate - -    Oxygen Saturation 98% 01/12/2023 12:59 PM CST    Inhaled Oxygen Concentration - -    Weight 71.8 kg (158 lb 6.4 oz) 01/12/2023 12:59 PM CST    Height 180.3 cm (5' 11") 01/12/2023 12:59 PM CST    Body Mass Index 22.09 01/12/2023 12:59 PM CST      Progress Notes  - documented in this encounter  Minda " MD Yamileth - 2023 1:00 PM CST  Formatting of this note is different from the original.  CARDIOLOGY CLINIC NOTE   From 35% to  Date of Service:   23    Chief Complaint:  Ilene Mora is a 77 y.o. who presents to clinic for follow up    History of Present Illness:  Ilene Mora presents today for routine follow up.     Ilene Mora presents today for routine follow up. With h/o chb/sp At which time she presented with ex intolerance Link LH and near syncope PPM .DD MR and LBBB chf ref      She had symtomatic complete heart block in  s/p PPM   In 2021 she had l  That led to pharm ett and cardiac echo showed DD, and mild MR  She then had cath 3/2021 No sig obstructive disease   Her patricia pep was 221   She then had sx of chf with link fatigue and Ex intolerance  t  \she had echo  with ef of 35 % secondary to lv dyschrony and rv pacing   bnp 2022 was 53  Then seen in chf clinic to initiate EBGMT On bb ARNI And tolerating meds   This led to Dual chamber BI ppm in 2022 as she had > 80 % rv pacing For LV dyschrony, LBBB chf an d worsening lv fx    She has been feeling so much better Stamina improved  No sx of link, pnd palpitations     PPM bi interrogated 2023nl threwholds  Batter > 9 yr nl fx     EKG today v paced        Past Medical History     Past Medical History:   Diagnosis Date   · Age-related macular degeneration   bilat   · Arthritis   hands   · Cardiac dysrhythmia   · Cataract   · Esophageal stricture   · GERD (gastroesophageal reflux disease)   · Heart block   · HLD (hyperlipidemia)   · Hypotension   · Mitral regurgitation   · Osteopenia   · Other seasonal allergic rhinitis   · Pacemaker   · SOB (shortness of breath)   · Trigeminal neuralgia     Past Surgical History:   Procedure Laterality Date   · CATARACT EXTRACTION, BILATERAL   ·  SECTION, CLASSIC   · CHOLECYSTECTOMY      · COLONOSCOPY   · DIAGNOSTIC EPS N/A 2022   Procedure: Implantation  "of LV-Lead  [employer]; Surgeon: Romero Burch MD; Location: [facility]; Service: Cardiology; Laterality: N/A;   · PACEMAKER INSERTION   · PARTIAL HYSTERECTOMY   · TONSILLECTOMY     Allergies   Allergen Reactions   · Sulfadiazine Rash and Swelling   · Prednisone Other (See Comments)   Not sure reaction, possibly fainted     Family History   Problem Relation Age of Onset   · Diabetes Mother   · Heart failure Mother   · Coronary art dis Father   · Cancer Father   · Hypertension Sister   · Osteoarthritis Sister   · Parkinsonism Sister   · Hypertension Brother   · Breast cancer Maternal Grandmother 55   passed 65     Review of Systems:  Constitutional: fatigue no fever  Skin: no rash. bruising, change in nail color  Eyes: no recent vision problems or eye pain no diplopia.  ENT:[occasional congestion,no ear pain, or sore throat.  Endocrine: no unusual hair growth, or intolerance to cold  Cardiovascular: as above]  Respiratory: occasional cough, occ shortness of breath, no wheezing  Gastrointestinal: no abdoinal pain, nausea, vomiting, or diarrhea. occ dyspepsia  Genitourinary: no dysuria.hematuria, polyuria  Musculoskeletal: occasional myalgias, arthralgias  Neurologic: no headache migraines,numbness  Hematologic: no unusual bruising or bleeding.  Psychiatric: anxiety depression   :  PHYSICAL EXAM  Vitals:   01/12/23 1259   BP: 118/66   Pulse: 62   SpO2: 98%   Weight: 71.8 kg (158 lb 6.4 oz)   Height: 1.803 m (5' 11")     Patient Vitals for the past 24 hrs:  Height Weight   01/12/23 1259 1.803 m (5' 11") 71.8 kg (158 lb 6.4 oz)     General well developed NAD  Head normal no sign of trauma  Eyes pupil equal no icterus  Mouth normal mucosa  Neck supple no jvd, carotid normal  Respiratory lungs clear bilaterally  Cardiac RRR murmur   Pulses normal   Abd soft non tender BS normal  Muscularskeletal normal range of motion , no edema  Neurological Alert oriented no deficets  Psychiatry mood normal coorperative  Skin no " rash or lesions     Lab Results:  Lab Results   Component Value Date   CREATININE 0.83 2022   CREATININE 0.88 10/26/2022   CREATININE 0.97 (H) 2022    2022    10/26/2022    2022   K 4.0 2022   K 4.3 10/26/2022   K 4.7 2022    2022    10/26/2022    2022   CO2 33 (H) 2022   CO2 27 10/26/2022   CO2 26 2022   BUN 14.0 2022   BUN 22 10/26/2022   BUN 18 2022     Lab Results   Component Value Date   AST 20 2022   AST 16 2022   ALT 17 2022   ALT 15 2022     Lab Results   Component Value Date   WBC 7.4 2022   WBC 5.3 2022   HGB 12.0 2022   HGB 12.5 2022   MCV 93.7 2022    2022    2022     No results found for: CHOL, HDL, LDL, TRIG, NONHDLC, LDLCALC, LDLDIRECT, LDLHDL, CHOLHDL]  Lab Results   Component Value Date   BNP 53 2022     Diagnostic Studies:  Reviewed by me .      2022  RECOMMENDATIONS:   Status post [employer] Bi V pacemaker upgrade for complete heart block with congestive heart failure and worsening LV systolic function    No results found for this or any previous visit (from the past 4464 hour(s)).    Encounter Date: 22   Transthoracic Echocardiogram (TTE) Complete   Narrative   MMode/2D Measurements   IVSd: 0.99 cm LVIDd: 3.9 cm MVA(traced): 2.4 cm2  LVIDs: 2.7 cm  LVPWd: 0.90 cm  _________________________________________________________________________________  Ao root diam: 2.3 cm LVOT diam: 1.9 cm    Ao root area: 4.1 cm2 LVOT area: 2.9 cm2  LA dimension: 3.3 cm    Doppler Measurements   MV E max anjelica: 53.3 cm/sec MV dec slope: 188.4 cm/sec2 Ao V2 max: 139.8 cm/sec  MV A max anjelica: 85.8 cm/sec MV dec time: 0.31 sec Ao max P.8 mmHg  MV E/A: 0.62 Ao mean P.7 mmHg  Ao V2 VTI: 31.9 cm  MARYAN(I,D): 1.9 cm2    MARYAN(V,A): 1.9 cm2  MARYAN(V,D): 1.9  cm2    _________________________________________________________________________________  LV V1 max PG: 3.4 mmHg MR max jens: 456.3 cm/sec  AI dec slope: 180.4 cm/sec2 LV V1 mean P.7 mmHg MR max P.3 mmHg  LV V1 max: 92.6 cm/sec  LV V1 mean: 59.1 cm/sec  LV V1 VTI: 20.6 cm    _________________________________________________________________________________  SV(LVOT): 59.4 ml TR max jens: 203.3 cm/sec Lat Peak E' Jens: 5.1 cm/sec  Dimensionless Index: 0.64 TR max P.0 mmHg    _________________________________________________________________________________  E/E' Lateral: 10.4    Procedure Details:  A two-dimensional transthoracic echocardiogram with color flow and Doppler was performed. The study  was technically good with many images being of high quality.    Left Ventricle:  Left ventricular walls are upper limits of normal for size. Abnormal (paradoxical) septal motion  consistent with RV pacemaker . lv dyschrony. EF approx 35 %. Grade I diastolic dysfunction,  (abnormal relaxation pattern).    Left Atrium/Atrial Septum:  The left atrium is moderately dilated. An interatrial septal aneurysm is suggested.    Right Atrium:  There is a catheter/pacemaker lead seen in the RA cavity. The right atrium is mild to moderately  dilated.    Right Ventricle:  The right ventricle is normal in size and function. There is a pacemaker lead in the right  ventricle. There is no mass or thrombus in the right ventricle.    Aortic Valve:  There is mild aortic sclerosis. There is no aortic valvular vegetation. Mild aortic regurgitation.    Mitral Valve:  The mitral valve is normal in structure and function. There is no vegetation seen on the mitral  valve. There is mild to moderate mitral regurgitation.    Tricuspid Valve:  Anatomically normal tricuspid valve. There is no tricuspid valve vegetation. There is mild tricuspid  regurgitation.    Pulmonic Valve:  The pulmonic valve is not well visualized. Mild pulmonic valvular  regurgitation.    Arteries:  The aortic root is normal size.    Venous:  IVC partially collapsed with inspiration, can't exclude elevated right sided pressures.    Pericardium/Pleura:  There is no pericardial effusion.    Interpretation Summary  An interatrial septal aneurysm is suggested.  IVC partially collapsed with inspiration, can't exclude elevated right sided pressures.  There is no aortic valvular vegetation.  There is no vegetation seen on the mitral valve.  There is no tricuspid valve vegetation.  The pulmonic valve is not well visualized.  There is a pacemaker lead in the right ventricle.  There is no mass or thrombus in the right ventricle.  There is mild to moderate mitral regurgitation.  There is mild aortic sclerosis.  The left atrium is moderately dilated.  The right atrium is mild to moderately dilated.  Mild aortic regurgitation.  Grade I diastolic dysfunction, (abnormal relaxation pattern).  lv dyschrony  EF approx 35 %    ______________________________________________________________________________  Electronically signed by: Joanna Carson MD 11/03/2022 10:16 AM  Ordering Physician: JOANNA CARSON  Referring Physician: JOANNA CARSON  Performed By: Jessica Johnson        Current Medications:  Current Outpatient Medications on File Prior to Visit   Medication Sig Dispense Refill   · atorvastatin (LIPITOR) 40 MG tablet TAKE 1 TABLET EVERY DAY 90 tablet 0   · ENTRESTO 24-26 mg Tab tablet Take 1 tablet daily by mouth   · ibandronate (BONIVA) 150 mg tablet TAKE 1 TABLET EVERY MONTH. DO NOT LIE DOWN FOR AT LEAST 1 HOUR 3 tablet 0   · JARDIANCE 10 mg Tab tablet TAKE 1 TABLET EVERY MORNING 30 tablet 0   · metoprolol succinate (TOPROL XL) 25 MG 24 hr tablet TAKE 1 TABLET EVERY DAY 90 tablet 1   · vit A/vit C/vit E/zinc/copper (ICAPS AREDS ORAL) Take by mouth     No current facility-administered medications on file prior to visit.     PROBLEM LIST:     Encounter Diagnoses   Name Primary?   · Left  ventricular dyssynchrony   · Nonrheumatic mitral valve regurgitation   · Pacemaker   · LBBB (left bundle branch block)   · Chronic systolic congestive heart failure (CMS/HCC)   · Stage 3a chronic kidney disease (CMS/HCC)   · Coronary artery disease involving native coronary artery of native heart without angina pectoris     PLAN:     Name Primary?   · Left ventricular dyssynchrony  S/p bi PPM   · Nonrheumatic mitral valve regurgitation  monito   · Pacemaker   · LBBB (left bundle branch block)   · Chronic systolic congestive heart failure (CMS/HCC) c  clinically improved   tolerting meds And ed ordered   · Stage 3a chronic kidney disease (CMS/HCC)   · Coronary artery disease involving native coronary artery of native heart without angina pectoris     Prevention    Preventive education  For smokers: Educated and strongly counseled on smoking cessation.   Adopt a heart healthy diet  Counseled on various heart healthy diets  -Mediterranean diet- High in fruits vegetable, grains, fish, nuts and extra-virgin olive oil. Minimize processed, salty foods, packaged foods.  -Dash diet to lower HTN-Similar to mediterranean diet, low fat dairy, meatless meals and low sodium  -Plant based diet: Foundation being minimally processed and whole foods.   -Avoid foods in saturated fats and trans fats.   Aerobic exercise 30 minutes 5 times/ week.   Reduce and limit alcohol intake.   Learn stress management / relaxation techniques such as mindfulness and meditation.   Encourage self and family members to adopt healthy choices at a young age.          MMode/2D Measurements   IVSd: 0.95 cm                       LVIDd: 5.0 cm                 MVA(traced): 2.7 cm2                                       LVIDs: 3.5 cm                                       LVPWd: 1.2 cm             _________________________________________________________________________________   Ao root diam: 2.4 cm                LVOT diam: 2.3 cm   Ao root area: 4.4 cm2                LVOT area: 4.3 cm2   LA dimension: 3.8 cm     Doppler Measurements   MV E max jens: 56.1 cm/sec                                             Ao V2 max: 112.4 cm/sec   MV A max jens: 76.9 cm/sec           MV dec slope: 198.9 cm/sec2       Ao max P.1 mmHg   MV E/A: 0.73                        MV dec time: 0.28 sec             Ao V2 mean: 75.6 cm/sec                                                                         Ao mean P.6 mmHg                                                                         Ao V2 VTI: 23.4 cm               _________________________________________________________________________________                                       MR max jens: 460.4 cm/sec          TR max jens: 212.8 cm/sec   AI dec slope: 109.1 cm/sec2         MR max P.9 mmHg              TR max P.2 mmHg               _________________________________________________________________________________   Lat Peak E' Jens: 11.0 cm/sec        E/E' Lateral: 5.1     Procedure Details:   A two-dimensional transthoracic echocardiogram with color flow and Doppler was performed. The study   was technically good with many images being of high quality.     Left Ventricle:   The left ventricular size, thickness and function are normal. Abnormal (paradoxical) septal motion   consistent with RV pacemaker . Ejection Fraction = 45-50%. Left ventricular systolic function is   mildly reduced. Grade I diastolic dysfunction, (abnormal relaxation pattern).     Left Atrium/Atrial Septum:   The left atrial size is normal.     Right Atrium:   Right atrial size is normal.     Right Ventricle:   The right ventricle is normal in size and function. There is a pacemaker lead in the right   ventricle. The right ventricular systolic function is normal.     Aortic Valve:   Anatomically normal aortic valve. No hemodynamically significant valvular aortic stenosis. Trace   (trivial) aortic regurgitation.     Mitral Valve:   The mitral valve  is normal in structure and function. There is mild mitral regurgitation.     Tricuspid Valve:   Anatomically normal tricuspid valve. Peak PA systolic pressure is normal. There is mild tricuspid   regurgitation.     Pulmonic Valve:   Anatomically normal pulmonic valve. There is no pulmonic valvular regurgitation.     Arteries:   The aortic root is normal size.     Venous:   The inferior vena cava is normal in size, with a normal collapsibility index.     Pericardium/Pleura:   There is no pericardial effusion.     Interpretation Summary   Left ventricular systolic function is mildly reduced.   Ejection Fraction = 45-50%.   Grade I diastolic dysfunction, (abnormal relaxation pattern).   Abnormal (paradoxical) septal motion consistent with RV pacemaker .   Trace (trivial) aortic regurgitation.   There is mild mitral regurgitation.   There is mild tricuspid regurgitation.   The inferior vena cava is normal in size, with a normal collapsibility index.   ______________________________________________________________________________   Electronically signed by:               Ala Mohsen 03/31/2023 03:50 PM   Ordering Physician: Minda Carson MD   Referring Physician: Minda Carson MD   Performed By: Maryellen Haines  Procedure Note    Mohsen, Ala Mousa, MD - 03/31/2023   Formatting of this note might be different from the original.            History reviewed. No pertinent past medical history.    Review of patient's allergies indicates:   Allergen Reactions    Sulfadiazine      Other reaction(s): flushed, swelling         Current Outpatient Medications:     atorvastatin (LIPITOR) 40 MG tablet, Take 40 mg by mouth once daily., Disp: , Rfl:     ENTRESTO 24-26 mg per tablet, Take 1 tablet by mouth 2 (two) times daily. Takes 0.5 mg daily, Disp: , Rfl:     ibandronate (BONIVA) 150 mg tablet, Take 150 mg by mouth every 30 days., Disp: , Rfl:     JARDIANCE 10 mg tablet, Take 5 mg by mouth once daily. 1/2 tab daily, Disp: , Rfl:      vit A/vit C/vit E/zinc/copper (ICAPS AREDS ORAL), Take by mouth Daily., Disp: , Rfl:     metoprolol succinate (TOPROL-XL) 25 MG 24 hr tablet, Take 1 tablet (25 mg total) by mouth every evening., Disp: , Rfl:      Objective:   Review of Systems   Cardiovascular:  Negative for chest pain, claudication, cyanosis, dyspnea on exertion, irregular heartbeat, leg swelling, near-syncope, orthopnea, palpitations, paroxysmal nocturnal dyspnea and syncope.         Vitals:    09/12/23 1414   BP: 122/79   Pulse: 72     Wt Readings from Last 3 Encounters:   09/12/23 70.6 kg (155 lb 10.3 oz)   08/22/23 68.9 kg (152 lb)   05/26/23 69.1 kg (152 lb 5.4 oz)     Temp Readings from Last 3 Encounters:   No data found for Temp     BP Readings from Last 3 Encounters:   09/12/23 122/79   08/22/23 112/70   05/26/23 126/75     Pulse Readings from Last 3 Encounters:   09/12/23 72   08/22/23 70   05/26/23 75             Physical Exam  Vitals reviewed.   Constitutional:       General: She is not in acute distress.     Appearance: She is well-developed.   HENT:      Head: Normocephalic and atraumatic.      Nose: Nose normal.   Eyes:      Conjunctiva/sclera: Conjunctivae normal.      Pupils: Pupils are equal, round, and reactive to light.   Neck:      Vascular: No carotid bruit or JVD.   Cardiovascular:      Rate and Rhythm: Normal rate and regular rhythm.      Pulses: Normal pulses and intact distal pulses.      Heart sounds: Normal heart sounds. No murmur heard.     No friction rub. No gallop.   Pulmonary:      Effort: Pulmonary effort is normal. No respiratory distress.      Breath sounds: Normal breath sounds. No wheezing or rales.   Chest:      Chest wall: No tenderness.   Abdominal:      General: Bowel sounds are normal. There is no distension.      Palpations: Abdomen is soft.      Tenderness: There is no abdominal tenderness.   Musculoskeletal:         General: No tenderness or deformity. Normal range of motion.        Arms:        "Cervical back: Normal range of motion and neck supple.      Right lower leg: No edema.      Left lower leg: No edema.   Skin:     General: Skin is warm and dry.      Findings: No erythema or rash.   Neurological:      Mental Status: She is alert and oriented to person, place, and time.      Cranial Nerves: No cranial nerve deficit.      Motor: No abnormal muscle tone.      Coordination: Coordination normal.   Psychiatric:         Behavior: Behavior normal.         Thought Content: Thought content normal.         Judgment: Judgment normal.           No results found for: "CHOL"  No results found for: "HDL"  No results found for: "LDLCALC"  No results found for: "ALT", "AST"  No results found for: "CREATININE", "BUN", "NA", "K", "CO2"  No results found for: "HGB", "HCT"            EKG shows sinus rhythm with biventricular paced rhythm          Assessment and Plan:     Chronic systolic heart failure  Comments:  Currently compensated, continue tolerated guideline directed medical therapy  Orders:  -     metoprolol succinate (TOPROL-XL) 25 MG 24 hr tablet; Take 1 tablet (25 mg total) by mouth every evening.    Cardiac resynchronization therapy pacemaker (CRT-P) in place  Comments:  Great response to therapy    Left ventricular dyssynchrony  Comments:  Cause of LV ventricular dysfunction, improved post CRT    LBBB (left bundle branch block)    Pure hypercholesterolemia    Heart block    Diastolic dysfunction    Coronary artery disease involving native coronary artery of native heart without angina pectoris         Follow up in about 6 months (around 3/12/2024).          No future appointments.              "

## 2023-10-05 ENCOUNTER — TELEPHONE (OUTPATIENT)
Dept: CARDIOLOGY | Facility: CLINIC | Age: 78
End: 2023-10-05
Payer: COMMERCIAL

## 2023-10-05 NOTE — TELEPHONE ENCOUNTER
Former [facility] pt;   Stating pace-maker has to be transferred to [facility];    Advised pt will call [facility] to transfer;  Requested transfer with Gris at [facility];

## 2023-10-05 NOTE — TELEPHONE ENCOUNTER
----- Message from Angelika Zuniga sent at 10/5/2023  3:45 PM CDT -----  Regarding: Returning call  Patient returning call, please call back @ [phone]. Thank you Angelika

## 2023-10-05 NOTE — TELEPHONE ENCOUNTER
----- Message from Alysa Casanova MA sent at 10/5/2023  1:46 PM CDT -----  Regarding: FW: [employer]  Please call pt. I don't know much about [employer].     Thanks   ----- Message -----  From: Lynette Ross  Sent: 10/5/2023   9:55 AM CDT  To: [facility] Staff  Subject: [employer]                                       Pt [phone] would like a call in ref to something with [employer]    Thanks

## 2023-12-13 ENCOUNTER — CLINICAL SUPPORT (OUTPATIENT)
Dept: CARDIOLOGY | Facility: HOSPITAL | Age: 78
End: 2023-12-13
Payer: COMMERCIAL

## 2023-12-13 ENCOUNTER — CLINICAL SUPPORT (OUTPATIENT)
Dept: CARDIOLOGY | Facility: HOSPITAL | Age: 78
End: 2023-12-13
Attending: INTERNAL MEDICINE
Payer: COMMERCIAL

## 2023-12-13 DIAGNOSIS — I44.2 ATRIOVENTRICULAR BLOCK, COMPLETE: ICD-10-CM

## 2023-12-13 PROCEDURE — 93294 REM INTERROG EVL PM/LDLS PM: CPT | Mod: ,,, | Performed by: INTERNAL MEDICINE

## 2023-12-13 PROCEDURE — 93296 REM INTERROG EVL PM/IDS: CPT | Performed by: INTERNAL MEDICINE

## 2024-01-08 LAB
OHS CV AF BURDEN PERCENT: < 1
OHS CV BIV PACING PERCENT: 99.96 %
OHS CV DC REMOTE DEVICE TYPE: NORMAL
OHS CV ICD SHOCK: NO
OHS CV RV PACING PERCENT: 99.99 %

## 2024-03-12 ENCOUNTER — OFFICE VISIT (OUTPATIENT)
Dept: CARDIOLOGY | Facility: CLINIC | Age: 79
End: 2024-03-12
Payer: COMMERCIAL

## 2024-03-12 ENCOUNTER — CLINICAL SUPPORT (OUTPATIENT)
Dept: CARDIOLOGY | Facility: HOSPITAL | Age: 79
End: 2024-03-12

## 2024-03-12 VITALS
HEIGHT: 70 IN | BODY MASS INDEX: 22.13 KG/M2 | SYSTOLIC BLOOD PRESSURE: 101 MMHG | HEART RATE: 69 BPM | DIASTOLIC BLOOD PRESSURE: 64 MMHG | WEIGHT: 154.56 LBS

## 2024-03-12 DIAGNOSIS — I44.7 LBBB (LEFT BUNDLE BRANCH BLOCK): ICD-10-CM

## 2024-03-12 DIAGNOSIS — I25.10 CORONARY ARTERY DISEASE INVOLVING NATIVE CORONARY ARTERY OF NATIVE HEART WITHOUT ANGINA PECTORIS: ICD-10-CM

## 2024-03-12 DIAGNOSIS — I45.9 HEART BLOCK: ICD-10-CM

## 2024-03-12 DIAGNOSIS — E78.00 PURE HYPERCHOLESTEROLEMIA: ICD-10-CM

## 2024-03-12 DIAGNOSIS — I12.9 BENIGN HYPERTENSION WITH CHRONIC KIDNEY DISEASE: ICD-10-CM

## 2024-03-12 DIAGNOSIS — I50.22 CHRONIC SYSTOLIC HEART FAILURE: Primary | ICD-10-CM

## 2024-03-12 DIAGNOSIS — I44.2 ATRIOVENTRICULAR BLOCK, COMPLETE: ICD-10-CM

## 2024-03-12 DIAGNOSIS — Z95.0 CARDIAC RESYNCHRONIZATION THERAPY PACEMAKER (CRT-P) IN PLACE: ICD-10-CM

## 2024-03-12 PROCEDURE — 1101F PT FALLS ASSESS-DOCD LE1/YR: CPT | Mod: CPTII,S$GLB,, | Performed by: INTERNAL MEDICINE

## 2024-03-12 PROCEDURE — 1159F MED LIST DOCD IN RCRD: CPT | Mod: CPTII,S$GLB,, | Performed by: INTERNAL MEDICINE

## 2024-03-12 PROCEDURE — 1126F AMNT PAIN NOTED NONE PRSNT: CPT | Mod: CPTII,S$GLB,, | Performed by: INTERNAL MEDICINE

## 2024-03-12 PROCEDURE — 3288F FALL RISK ASSESSMENT DOCD: CPT | Mod: CPTII,S$GLB,, | Performed by: INTERNAL MEDICINE

## 2024-03-12 PROCEDURE — 3078F DIAST BP <80 MM HG: CPT | Mod: CPTII,S$GLB,, | Performed by: INTERNAL MEDICINE

## 2024-03-12 PROCEDURE — 99999 PR PBB SHADOW E&M-EST. PATIENT-LVL III: CPT | Mod: PBBFAC,,, | Performed by: INTERNAL MEDICINE

## 2024-03-12 PROCEDURE — 99214 OFFICE O/P EST MOD 30 MIN: CPT | Mod: S$GLB,,, | Performed by: INTERNAL MEDICINE

## 2024-03-12 PROCEDURE — 3074F SYST BP LT 130 MM HG: CPT | Mod: CPTII,S$GLB,, | Performed by: INTERNAL MEDICINE

## 2024-03-12 NOTE — PROGRESS NOTES
"Subjective:   03/12/2024     Patient ID:  Ilene Mora is a 78 y.o. female who presents for evaulation of Hyperlipidemia, Congestive Heart Failure, and Coronary Artery Disease      Comes in for cardiac follow-up.  She had been followed by Dr. Carson.  I had put her pacemaker in 10 years ago.  She had had complete heart block.  Due to right ventricular pacing, she developed a dilated cardiomyopathy and 3 months ago underwent upgrade to a biventricular pacemaker.  She is since noted pain around the pacemaker.  There has been no fever chills, no redness in the site.  Echocardiography subsequent to pacemaker implantation showed ejection fraction improvement to 45-50% in March 2023.  She is on guideline directed medical therapy for systolic heart failure including metoprolol, Entresto and Jardiance.  Repeat echocardiography in 8/23 showed normalization of left ventricular systolic function.  Now tolerating 1/2 Entresto b.i.d., 1 Jardiance daily and metoprolol 25 mg daily    She does not have coronary artery disease, cardiac catheterization shows normal coronary arteries in 2021.    Hypertension appears to be well controlled on guideline directed medical therapy.      Hypercholesterolemia is treated with high-dose statin therapy.  See outside lab      Last Filed Vital Signs  - documented in this encounter  Last Filed Vital Signs  Vital Sign Reading Time Taken Comments  Blood Pressure 118/66 01/12/2023 12:59 PM CST    Pulse 62 01/12/2023 12:59 PM CST    Temperature - -    Respiratory Rate - -    Oxygen Saturation 98% 01/12/2023 12:59 PM CST    Inhaled Oxygen Concentration - -    Weight 71.8 kg (158 lb 6.4 oz) 01/12/2023 12:59 PM CST    Height 180.3 cm (5' 11") 01/12/2023 12:59 PM CST    Body Mass Index 22.09 01/12/2023 12:59 PM CST      Progress Notes  - documented in this encounter  Minda Carson MD - 01/12/2023 1:00 PM CST  Formatting of this note is different from the original.  CARDIOLOGY CLINIC NOTE   From 35% " to  Date of Service:   23    Chief Complaint:  Ilene Mora is a 77 y.o. who presents to clinic for follow up    History of Present Illness:  Ilene Mora presents today for routine follow up.     Ilene Mora presents today for routine follow up. With h/o chb/sp At which time she presented with ex intolerance Link LH and near syncope PPM .DD MR and LBBB chf ref      She had symtomatic complete heart block in  s/p PPM   In 2021 she had l  That led to pharm ett and cardiac echo showed DD, and mild MR  She then had cath 3/2021 No sig obstructive disease   Her patricia pep was 221   She then had sx of chf with link fatigue and Ex intolerance  t  \she had echo  with ef of 35 % secondary to lv dyschrony and rv pacing   bnp 2022 was 53  Then seen in chf clinic to initiate EBGMT On bb ARNI And tolerating meds   This led to Dual chamber BI ppm in 2022 as she had > 80 % rv pacing For LV dyschrony, LBBB chf an d worsening lv fx    She has been feeling so much better Stamina improved  No sx of link, pnd palpitations     PPM bi interrogated 2023nl threwholds  Batter > 9 yr nl fx     EKG today v paced        Past Medical History     Past Medical History:   Diagnosis Date   · Age-related macular degeneration   bilat   · Arthritis   hands   · Cardiac dysrhythmia   · Cataract   · Esophageal stricture   · GERD (gastroesophageal reflux disease)   · Heart block   · HLD (hyperlipidemia)   · Hypotension   · Mitral regurgitation   · Osteopenia   · Other seasonal allergic rhinitis   · Pacemaker   · SOB (shortness of breath)   · Trigeminal neuralgia     Past Surgical History:   Procedure Laterality Date   · CATARACT EXTRACTION, BILATERAL   ·  SECTION, CLASSIC   · CHOLECYSTECTOMY      · COLONOSCOPY   · DIAGNOSTIC EPS N/A 2022   Procedure: Implantation of LV-Lead  MEDTRONIC; Surgeon: Romero Burch MD; Location: [facility]; Service: Cardiology; Laterality: N/A;   · PACEMAKER  "INSERTION   · PARTIAL HYSTERECTOMY   · TONSILLECTOMY     Allergies   Allergen Reactions   · Sulfadiazine Rash and Swelling   · Prednisone Other (See Comments)   Not sure reaction, possibly fainted     Family History   Problem Relation Age of Onset   · Diabetes Mother   · Heart failure Mother   · Coronary art dis Father   · Cancer Father   · Hypertension Sister   · Osteoarthritis Sister   · Parkinsonism Sister   · Hypertension Brother   · Breast cancer Maternal Grandmother 55   passed 65     Review of Systems:  Constitutional: fatigue no fever  Skin: no rash. bruising, change in nail color  Eyes: no recent vision problems or eye pain no diplopia.  ENT:[occasional congestion,no ear pain, or sore throat.  Endocrine: no unusual hair growth, or intolerance to cold  Cardiovascular: as above]  Respiratory: occasional cough, occ shortness of breath, no wheezing  Gastrointestinal: no abdoinal pain, nausea, vomiting, or diarrhea. occ dyspepsia  Genitourinary: no dysuria.hematuria, polyuria  Musculoskeletal: occasional myalgias, arthralgias  Neurologic: no headache migraines,numbness  Hematologic: no unusual bruising or bleeding.  Psychiatric: anxiety depression   :  PHYSICAL EXAM  Vitals:   01/12/23 1259   BP: 118/66   Pulse: 62   SpO2: 98%   Weight: 71.8 kg (158 lb 6.4 oz)   Height: 1.803 m (5' 11")     Patient Vitals for the past 24 hrs:  Height Weight   01/12/23 1259 1.803 m (5' 11") 71.8 kg (158 lb 6.4 oz)     General well developed NAD  Head normal no sign of trauma  Eyes pupil equal no icterus  Mouth normal mucosa  Neck supple no jvd, carotid normal  Respiratory lungs clear bilaterally  Cardiac RRR murmur   Pulses normal   Abd soft non tender BS normal  Muscularskeletal normal range of motion , no edema  Neurological Alert oriented no deficets  Psychiatry mood normal coorperative  Skin no rash or lesions     Lab Results:  Lab Results   Component Value Date   CREATININE 0.83 12/02/2022   CREATININE 0.88 10/26/2022 "   CREATININE 0.97 (H) 2022    2022    10/26/2022    2022   K 4.0 2022   K 4.3 10/26/2022   K 4.7 2022    2022    10/26/2022    2022   CO2 33 (H) 2022   CO2 27 10/26/2022   CO2 26 2022   BUN 14.0 2022   BUN 22 10/26/2022   BUN 18 2022     Lab Results   Component Value Date   AST 20 2022   AST 16 2022   ALT 17 2022   ALT 15 2022     Lab Results   Component Value Date   WBC 7.4 2022   WBC 5.3 2022   HGB 12.0 2022   HGB 12.5 2022   MCV 93.7 2022    2022    2022     No results found for: CHOL, HDL, LDL, TRIG, NONHDLC, LDLCALC, LDLDIRECT, LDLHDL, CHOLHDL]  Lab Results   Component Value Date   BNP 53 2022     Diagnostic Studies:  Reviewed by me .      2022  RECOMMENDATIONS:   Status post Medtronic Bi V pacemaker upgrade for complete heart block with congestive heart failure and worsening LV systolic function    No results found for this or any previous visit (from the past 4464 hour(s)).    Encounter Date: 22   Transthoracic Echocardiogram (TTE) Complete   Narrative   MMode/2D Measurements   IVSd: 0.99 cm LVIDd: 3.9 cm MVA(traced): 2.4 cm2  LVIDs: 2.7 cm  LVPWd: 0.90 cm  _________________________________________________________________________________  Ao root diam: 2.3 cm LVOT diam: 1.9 cm    Ao root area: 4.1 cm2 LVOT area: 2.9 cm2  LA dimension: 3.3 cm    Doppler Measurements   MV E max anjelica: 53.3 cm/sec MV dec slope: 188.4 cm/sec2 Ao V2 max: 139.8 cm/sec  MV A max anjelica: 85.8 cm/sec MV dec time: 0.31 sec Ao max P.8 mmHg  MV E/A: 0.62 Ao mean P.7 mmHg  Ao V2 VTI: 31.9 cm  MARYAN(I,D): 1.9 cm2    MARYAN(V,A): 1.9 cm2  MARYAN(V,D): 1.9 cm2    _________________________________________________________________________________  LV V1 max PG: 3.4 mmHg MR max anjelica: 456.3 cm/sec  AI dec slope: 180.4 cm/sec2 LV V1 mean P.7 mmHg MR max  P.3 mmHg  LV V1 max: 92.6 cm/sec  LV V1 mean: 59.1 cm/sec  LV V1 VTI: 20.6 cm    _________________________________________________________________________________  SV(LVOT): 59.4 ml TR max jens: 203.3 cm/sec Lat Peak E' Jens: 5.1 cm/sec  Dimensionless Index: 0.64 TR max P.0 mmHg    _________________________________________________________________________________  E/E' Lateral: 10.4    Procedure Details:  A two-dimensional transthoracic echocardiogram with color flow and Doppler was performed. The study  was technically good with many images being of high quality.    Left Ventricle:  Left ventricular walls are upper limits of normal for size. Abnormal (paradoxical) septal motion  consistent with RV pacemaker . lv dyschrony. EF approx 35 %. Grade I diastolic dysfunction,  (abnormal relaxation pattern).    Left Atrium/Atrial Septum:  The left atrium is moderately dilated. An interatrial septal aneurysm is suggested.    Right Atrium:  There is a catheter/pacemaker lead seen in the RA cavity. The right atrium is mild to moderately  dilated.    Right Ventricle:  The right ventricle is normal in size and function. There is a pacemaker lead in the right  ventricle. There is no mass or thrombus in the right ventricle.    Aortic Valve:  There is mild aortic sclerosis. There is no aortic valvular vegetation. Mild aortic regurgitation.    Mitral Valve:  The mitral valve is normal in structure and function. There is no vegetation seen on the mitral  valve. There is mild to moderate mitral regurgitation.    Tricuspid Valve:  Anatomically normal tricuspid valve. There is no tricuspid valve vegetation. There is mild tricuspid  regurgitation.    Pulmonic Valve:  The pulmonic valve is not well visualized. Mild pulmonic valvular regurgitation.    Arteries:  The aortic root is normal size.    Venous:  IVC partially collapsed with inspiration, can't exclude elevated right sided pressures.    Pericardium/Pleura:  There is no  pericardial effusion.    Interpretation Summary  An interatrial septal aneurysm is suggested.  IVC partially collapsed with inspiration, can't exclude elevated right sided pressures.  There is no aortic valvular vegetation.  There is no vegetation seen on the mitral valve.  There is no tricuspid valve vegetation.  The pulmonic valve is not well visualized.  There is a pacemaker lead in the right ventricle.  There is no mass or thrombus in the right ventricle.  There is mild to moderate mitral regurgitation.  There is mild aortic sclerosis.  The left atrium is moderately dilated.  The right atrium is mild to moderately dilated.  Mild aortic regurgitation.  Grade I diastolic dysfunction, (abnormal relaxation pattern).  lv dyschrony  EF approx 35 %    ______________________________________________________________________________  Electronically signed by: Joanna Carson MD 11/03/2022 10:16 AM  Ordering Physician: JOANNA CARSON  Referring Physician: JOANNA CARSON  Performed By: Jessica Johnson        Current Medications:  Current Outpatient Medications on File Prior to Visit   Medication Sig Dispense Refill   · atorvastatin (LIPITOR) 40 MG tablet TAKE 1 TABLET EVERY DAY 90 tablet 0   · ENTRESTO 24-26 mg Tab tablet Take 1 tablet daily by mouth   · ibandronate (BONIVA) 150 mg tablet TAKE 1 TABLET EVERY MONTH. DO NOT LIE DOWN FOR AT LEAST 1 HOUR 3 tablet 0   · JARDIANCE 10 mg Tab tablet TAKE 1 TABLET EVERY MORNING 30 tablet 0   · metoprolol succinate (TOPROL XL) 25 MG 24 hr tablet TAKE 1 TABLET EVERY DAY 90 tablet 1   · vit A/vit C/vit E/zinc/copper (ICAPS AREDS ORAL) Take by mouth     No current facility-administered medications on file prior to visit.     PROBLEM LIST:     Encounter Diagnoses   Name Primary?   · Left ventricular dyssynchrony   · Nonrheumatic mitral valve regurgitation   · Pacemaker   · LBBB (left bundle branch block)   · Chronic systolic congestive heart failure (CMS/HCC)   · Stage 3a chronic  kidney disease (CMS/HCC)   · Coronary artery disease involving native coronary artery of native heart without angina pectoris     PLAN:     Name Primary?   · Left ventricular dyssynchrony  S/p bi PPM   · Nonrheumatic mitral valve regurgitation  monito   · Pacemaker   · LBBB (left bundle branch block)   · Chronic systolic congestive heart failure (CMS/HCC) c  clinically improved   tolerting meds And ed ordered   · Stage 3a chronic kidney disease (CMS/HCC)   · Coronary artery disease involving native coronary artery of native heart without angina pectoris     Prevention    Preventive education  For smokers: Educated and strongly counseled on smoking cessation.   Adopt a heart healthy diet  Counseled on various heart healthy diets  -Mediterranean diet- High in fruits vegetable, grains, fish, nuts and extra-virgin olive oil. Minimize processed, salty foods, packaged foods.  -Dash diet to lower HTN-Similar to mediterranean diet, low fat dairy, meatless meals and low sodium  -Plant based diet: Foundation being minimally processed and whole foods.   -Avoid foods in saturated fats and trans fats.   Aerobic exercise 30 minutes 5 times/ week.   Reduce and limit alcohol intake.   Learn stress management / relaxation techniques such as mindfulness and meditation.   Encourage self and family members to adopt healthy choices at a young age.          MMode/2D Measurements   IVSd: 0.95 cm                       LVIDd: 5.0 cm                 MVA(traced): 2.7 cm2                                       LVIDs: 3.5 cm                                       LVPWd: 1.2 cm             _________________________________________________________________________________   Ao root diam: 2.4 cm                LVOT diam: 2.3 cm   Ao root area: 4.4 cm2               LVOT area: 4.3 cm2   LA dimension: 3.8 cm     Doppler Measurements   MV E max anjelica: 56.1 cm/sec                                             Ao V2 max: 112.4 cm/sec   MV A max anjelica: 76.9  cm/sec           MV dec slope: 198.9 cm/sec2       Ao max P.1 mmHg   MV E/A: 0.73                        MV dec time: 0.28 sec             Ao V2 mean: 75.6 cm/sec                                                                         Ao mean P.6 mmHg                                                                         Ao V2 VTI: 23.4 cm               _________________________________________________________________________________                                       MR max jens: 460.4 cm/sec          TR max jens: 212.8 cm/sec   AI dec slope: 109.1 cm/sec2         MR max P.9 mmHg              TR max P.2 mmHg               _________________________________________________________________________________   Lat Peak E' Jens: 11.0 cm/sec        E/E' Lateral: 5.1     Procedure Details:   A two-dimensional transthoracic echocardiogram with color flow and Doppler was performed. The study   was technically good with many images being of high quality.     Left Ventricle:   The left ventricular size, thickness and function are normal. Abnormal (paradoxical) septal motion   consistent with RV pacemaker . Ejection Fraction = 45-50%. Left ventricular systolic function is   mildly reduced. Grade I diastolic dysfunction, (abnormal relaxation pattern).     Left Atrium/Atrial Septum:   The left atrial size is normal.     Right Atrium:   Right atrial size is normal.     Right Ventricle:   The right ventricle is normal in size and function. There is a pacemaker lead in the right   ventricle. The right ventricular systolic function is normal.     Aortic Valve:   Anatomically normal aortic valve. No hemodynamically significant valvular aortic stenosis. Trace   (trivial) aortic regurgitation.     Mitral Valve:   The mitral valve is normal in structure and function. There is mild mitral regurgitation.     Tricuspid Valve:   Anatomically normal tricuspid valve. Peak PA systolic pressure is normal. There is mild  tricuspid   regurgitation.     Pulmonic Valve:   Anatomically normal pulmonic valve. There is no pulmonic valvular regurgitation.     Arteries:   The aortic root is normal size.     Venous:   The inferior vena cava is normal in size, with a normal collapsibility index.     Pericardium/Pleura:   There is no pericardial effusion.     Interpretation Summary   Left ventricular systolic function is mildly reduced.   Ejection Fraction = 45-50%.   Grade I diastolic dysfunction, (abnormal relaxation pattern).   Abnormal (paradoxical) septal motion consistent with RV pacemaker .   Trace (trivial) aortic regurgitation.   There is mild mitral regurgitation.   There is mild tricuspid regurgitation.   The inferior vena cava is normal in size, with a normal collapsibility index.   ______________________________________________________________________________   Electronically signed by:               Ala Mohsen 03/31/2023 03:50 PM   Ordering Physician: Minda Carson MD   Referring Physician: Minda Carson MD   Performed By: Maryellen Haines  Procedure Note    Mohsen, Ala Mousa, MD - 03/31/2023   Formatting of this note might be different from the original.            Past Medical History:   Diagnosis Date    CHF (congestive heart failure)     Coronary artery disease     Hyperlipidemia        Review of patient's allergies indicates:   Allergen Reactions    Sulfadiazine      Other reaction(s): flushed, swelling         Current Outpatient Medications:     atorvastatin (LIPITOR) 40 MG tablet, Take 40 mg by mouth once daily., Disp: , Rfl:     ENTRESTO 24-26 mg per tablet, Take 0.5 tablets by mouth 2 (two) times daily., Disp: , Rfl:     ibandronate (BONIVA) 150 mg tablet, Take 150 mg by mouth every 30 days., Disp: , Rfl:     JARDIANCE 10 mg tablet, Take 5 mg by mouth once daily. 1/2 tab daily, Disp: , Rfl:     metoprolol succinate (TOPROL-XL) 25 MG 24 hr tablet, Take 1 tablet (25 mg total) by mouth every evening., Disp: , Rfl:      vit A/vit C/vit E/zinc/copper (ICAPS AREDS ORAL), Take by mouth Daily., Disp: , Rfl:      Objective:   Review of Systems   Cardiovascular:  Negative for chest pain, claudication, cyanosis, dyspnea on exertion, irregular heartbeat, leg swelling, near-syncope, orthopnea, palpitations, paroxysmal nocturnal dyspnea and syncope.         Vitals:    03/12/24 1329   BP: 101/64   Pulse: 69       Wt Readings from Last 3 Encounters:   03/12/24 70.1 kg (154 lb 8.7 oz)   09/12/23 70.6 kg (155 lb 10.3 oz)   08/22/23 68.9 kg (152 lb)     Temp Readings from Last 3 Encounters:   No data found for Temp     BP Readings from Last 3 Encounters:   03/12/24 101/64   09/12/23 122/79   08/22/23 112/70     Pulse Readings from Last 3 Encounters:   03/12/24 69   09/12/23 72   08/22/23 70             Physical Exam  Vitals reviewed.   Constitutional:       General: She is not in acute distress.     Appearance: She is well-developed.   HENT:      Head: Normocephalic and atraumatic.      Nose: Nose normal.   Eyes:      Conjunctiva/sclera: Conjunctivae normal.      Pupils: Pupils are equal, round, and reactive to light.   Neck:      Vascular: No carotid bruit or JVD.   Cardiovascular:      Rate and Rhythm: Normal rate and regular rhythm.      Pulses: Normal pulses and intact distal pulses.      Heart sounds: Normal heart sounds. No murmur heard.     No friction rub. No gallop.   Pulmonary:      Effort: Pulmonary effort is normal. No respiratory distress.      Breath sounds: Normal breath sounds. No wheezing or rales.   Chest:      Chest wall: No tenderness.   Abdominal:      General: Bowel sounds are normal. There is no distension.      Palpations: Abdomen is soft.      Tenderness: There is no abdominal tenderness.   Musculoskeletal:         General: No tenderness or deformity. Normal range of motion.        Arms:       Cervical back: Normal range of motion and neck supple.      Right lower leg: No edema.      Left lower leg: No edema.   Skin:      "General: Skin is warm and dry.      Findings: No erythema or rash.   Neurological:      Mental Status: She is alert and oriented to person, place, and time.      Cranial Nerves: No cranial nerve deficit.      Motor: No abnormal muscle tone.      Coordination: Coordination normal.   Psychiatric:         Behavior: Behavior normal.         Thought Content: Thought content normal.         Judgment: Judgment normal.           No results found for: "CHOL"  No results found for: "HDL"  No results found for: "LDLCALC"  No results found for: "ALT", "AST"  No results found for: "CREATININE", "BUN", "NA", "K", "CO2"  No results found for: "HGB", "HCT"            EKG shows sinus rhythm with biventricular paced rhythm          Assessment and Plan:     Chronic systolic heart failure  Comments:  Currently asymptomatic on guideline directed medical therapy, max tolerated  Orders:  -     Echo; Future; Expected date: 09/12/2024    Coronary artery disease involving native coronary artery of native heart without angina pectoris  Comments:  Asymptomatic    LBBB (left bundle branch block)    Heart block  Comments:  Status post pacemaker    Cardiac resynchronization therapy pacemaker (CRT-P) in place  Comments:  Had LV dysfunction on chronic RV pacing, status post CRT with normal LV function now    Benign hypertension with chronic kidney disease  Comments:  Well controlled    Pure hypercholesterolemia  Comments:  Excellent on statin therapy, see outside lab.  Reviewed.           Follow up in about 6 months (around 9/12/2024).          No future appointments.                "

## 2024-03-13 ENCOUNTER — CLINICAL SUPPORT (OUTPATIENT)
Dept: CARDIOLOGY | Facility: HOSPITAL | Age: 79
End: 2024-03-13
Attending: INTERNAL MEDICINE
Payer: COMMERCIAL

## 2024-03-13 DIAGNOSIS — I44.2 ATRIOVENTRICULAR BLOCK, COMPLETE: ICD-10-CM

## 2024-03-13 PROCEDURE — 93294 REM INTERROG EVL PM/LDLS PM: CPT | Mod: ,,, | Performed by: INTERNAL MEDICINE

## 2024-03-13 PROCEDURE — 93296 REM INTERROG EVL PM/IDS: CPT | Performed by: INTERNAL MEDICINE

## 2024-03-25 DIAGNOSIS — I50.22 CHRONIC SYSTOLIC HEART FAILURE: ICD-10-CM

## 2024-03-25 RX ORDER — METOPROLOL SUCCINATE 25 MG/1
25 TABLET, EXTENDED RELEASE ORAL NIGHTLY
Qty: 90 TABLET | Refills: 4
Start: 2024-03-25 | End: 2024-04-08 | Stop reason: SDUPTHER

## 2024-03-25 RX ORDER — METOPROLOL SUCCINATE 25 MG/1
25 TABLET, EXTENDED RELEASE ORAL NIGHTLY
Qty: 90 TABLET | Refills: 4
Start: 2024-03-25 | End: 2024-03-25 | Stop reason: SDUPTHER

## 2024-04-08 DIAGNOSIS — I50.22 CHRONIC SYSTOLIC HEART FAILURE: ICD-10-CM

## 2024-04-08 RX ORDER — METOPROLOL SUCCINATE 25 MG/1
25 TABLET, EXTENDED RELEASE ORAL NIGHTLY
Qty: 90 TABLET | Refills: 4
Start: 2024-04-08 | End: 2024-06-13 | Stop reason: SDUPTHER

## 2024-04-08 NOTE — TELEPHONE ENCOUNTER
----- Message from Yuliana Saldaña sent at 4/8/2024  9:30 AM CDT -----  Regarding: RX  metoprolol succinate (TOPROL-XL) 25 MG 24 hr table    [facility] - [place], [place] - [address]   Phone: [phone]      PT IS ALMOST OUT OF THIS MEDICATION    Thx

## 2024-04-29 ENCOUNTER — TELEPHONE (OUTPATIENT)
Dept: ELECTROPHYSIOLOGY | Facility: CLINIC | Age: 79
End: 2024-04-29
Payer: COMMERCIAL

## 2024-04-29 ENCOUNTER — PATIENT MESSAGE (OUTPATIENT)
Dept: ELECTROPHYSIOLOGY | Facility: CLINIC | Age: 79
End: 2024-04-29
Payer: COMMERCIAL

## 2024-04-29 NOTE — TELEPHONE ENCOUNTER
Returned the pt's call on this afternoon and informed her that her Medtronic Remote home monitor is registered to Dr. Rivera.  Last remote tx received on 3/12/24 and the next scheduled is on 6/12/24.  Next in clinic PPM ck due in June as last in clinic device check was on 5/30/24.  Understanding was verbalized.  Pt appreciated the call.

## 2024-04-29 NOTE — TELEPHONE ENCOUNTER
----- Message from Ashley Mcdonough MA sent at 4/29/2024 11:33 AM CDT -----  Regarding: Monitoring issues  Please contact pt as she has questions I tried to answer her Billing and Monitoring questions but really could not  ----- Message -----  From: Katherin Smith MA  Sent: 4/29/2024  10:09 AM CDT  To: [facility] Church Staff    The patient need to talk to you about her pacemaker please call [phone]. Thank you.

## 2024-05-10 ENCOUNTER — HOSPITAL ENCOUNTER (OUTPATIENT)
Dept: CARDIOLOGY | Facility: HOSPITAL | Age: 79
Discharge: HOME | End: 2024-05-10
Attending: INTERNAL MEDICINE
Payer: COMMERCIAL

## 2024-05-10 VITALS
HEIGHT: 70 IN | HEART RATE: 70 BPM | WEIGHT: 154 LBS | DIASTOLIC BLOOD PRESSURE: 60 MMHG | BODY MASS INDEX: 22.05 KG/M2 | SYSTOLIC BLOOD PRESSURE: 110 MMHG

## 2024-05-10 DIAGNOSIS — I50.22 CHRONIC SYSTOLIC HEART FAILURE: ICD-10-CM

## 2024-05-10 LAB
ASCENDING AORTA: 3.11 CM
AV INDEX (PROSTH): 0.84
AV MEAN GRADIENT: 5 MMHG
AV PEAK GRADIENT: 8 MMHG
AV VALVE AREA BY VELOCITY RATIO: 2.69 CM²
AV VALVE AREA: 2.91 CM²
AV VELOCITY RATIO: 0.78
BSA FOR ECHO PROCEDURE: 1.86 M2
CV ECHO LV RWT: 0.37 CM
DOP CALC AO PEAK VEL: 1.44 M/S
DOP CALC AO VTI: 31.69 CM
DOP CALC LVOT AREA: 3.5 CM2
DOP CALC LVOT DIAMETER: 2.1 CM
DOP CALC LVOT PEAK VEL: 1.12 M/S
DOP CALC LVOT STROKE VOLUME: 92.09 CM3
DOP CALCLVOT PEAK VEL VTI: 26.6 CM
E WAVE DECELERATION TIME: 314.33 MSEC
E/A RATIO: 0.87
E/E' RATIO: 8 M/S
ECHO LV POSTERIOR WALL: 0.85 CM (ref 0.6–1.1)
FRACTIONAL SHORTENING: 37 % (ref 28–44)
HR MV ECHO: 70 BPM
INTERVENTRICULAR SEPTUM: 0.85 CM (ref 0.6–1.1)
LA MAJOR: 4.78 CM
LA MINOR: 4.63 CM
LA WIDTH: 3.64 CM
LEFT ATRIUM SIZE: 4.09 CM
LEFT ATRIUM VOLUME INDEX MOD: 17.1 ML/M2
LEFT ATRIUM VOLUME INDEX: 31.8 ML/M2
LEFT ATRIUM VOLUME MOD: 32.05 CM3
LEFT ATRIUM VOLUME: 59.52 CM3
LEFT INTERNAL DIMENSION IN SYSTOLE: 2.94 CM (ref 2.1–4)
LEFT VENTRICLE DIASTOLIC VOLUME INDEX: 53.44 ML/M2
LEFT VENTRICLE DIASTOLIC VOLUME: 99.94 ML
LEFT VENTRICLE MASS INDEX: 70 G/M2
LEFT VENTRICLE SYSTOLIC VOLUME INDEX: 17.8 ML/M2
LEFT VENTRICLE SYSTOLIC VOLUME: 33.26 ML
LEFT VENTRICULAR INTERNAL DIMENSION IN DIASTOLE: 4.65 CM (ref 3.5–6)
LEFT VENTRICULAR MASS: 129.98 G
LV LATERAL E/E' RATIO: 8.57 M/S
LV SEPTAL E/E' RATIO: 7.5 M/S
MV A" WAVE DURATION": 9.71 MSEC
MV PEAK A VEL: 0.69 M/S
MV PEAK E VEL: 0.6 M/S
MV STENOSIS PRESSURE HALF TIME: 91.16 MS
MV VALVE AREA P 1/2 METHOD: 2.41 CM2
OHS CV RV/LV RATIO: 0.64 CM
PISA TR MAX VEL: 2.31 M/S
PULM VEIN S/D RATIO: 1.5
PV PEAK D VEL: 0.3 M/S
PV PEAK S VEL: 0.45 M/S
RA MAJOR: 4.95 CM
RA PRESSURE ESTIMATED: 3 MMHG
RA WIDTH: 3.44 CM
RIGHT VENTRICULAR END-DIASTOLIC DIMENSION: 2.98 CM
RV TB RVSP: 5 MMHG
SINUS: 3.11 CM
STJ: 3.03 CM
TDI LATERAL: 0.07 M/S
TDI SEPTAL: 0.08 M/S
TDI: 0.08 M/S
TR MAX PG: 21 MMHG
TRICUSPID ANNULAR PLANE SYSTOLIC EXCURSION: 2.01 CM
TV REST PULMONARY ARTERY PRESSURE: 24 MMHG
Z-SCORE OF LEFT VENTRICULAR DIMENSION IN END DIASTOLE: -1.14
Z-SCORE OF LEFT VENTRICULAR DIMENSION IN END SYSTOLE: -0.71

## 2024-05-10 PROCEDURE — 93306 TTE W/DOPPLER COMPLETE: CPT

## 2024-05-10 PROCEDURE — 93306 TTE W/DOPPLER COMPLETE: CPT | Mod: 26,,, | Performed by: INTERNAL MEDICINE

## 2024-05-16 LAB
OHS CV AF BURDEN PERCENT: < 1
OHS CV BIV PACING PERCENT: 99.96 %
OHS CV DC REMOTE DEVICE TYPE: NORMAL
OHS CV RV PACING PERCENT: 99.99 %

## 2024-06-11 ENCOUNTER — CLINICAL SUPPORT (OUTPATIENT)
Dept: CARDIOLOGY | Facility: HOSPITAL | Age: 79
End: 2024-06-11
Attending: INTERNAL MEDICINE
Payer: COMMERCIAL

## 2024-06-11 DIAGNOSIS — Z95.0 PRESENCE OF CARDIAC PACEMAKER: ICD-10-CM

## 2024-06-11 DIAGNOSIS — I44.2 ATRIOVENTRICULAR BLOCK, COMPLETE: ICD-10-CM

## 2024-06-11 PROCEDURE — 93296 REM INTERROG EVL PM/IDS: CPT | Performed by: INTERNAL MEDICINE

## 2024-06-12 ENCOUNTER — TELEPHONE (OUTPATIENT)
Dept: ELECTROPHYSIOLOGY | Facility: CLINIC | Age: 79
End: 2024-06-12
Payer: COMMERCIAL

## 2024-06-12 ENCOUNTER — CLINICAL SUPPORT (OUTPATIENT)
Dept: CARDIOLOGY | Facility: HOSPITAL | Age: 79
End: 2024-06-12
Attending: INTERNAL MEDICINE
Payer: COMMERCIAL

## 2024-06-12 DIAGNOSIS — Z95.0 CARDIAC PACEMAKER IN SITU: ICD-10-CM

## 2024-06-12 PROCEDURE — 93281 PM DEVICE PROGR EVAL MULTI: CPT

## 2024-06-12 NOTE — TELEPHONE ENCOUNTER
Questions about Toprol Refill.  See it was ordered 4/8/2024 by Dr. Murphy for Dr. Rivera.  Pt will contact her pharmacy

## 2024-06-12 NOTE — TELEPHONE ENCOUNTER
Pt had questions about her digital bp monitor.   She was prev followed at [facility] with both her PPM and bp monitoring.   Pt believes her BP monitor was transferred to [facility]        Pt not enrolled in Digital HTN program at [facility]. Spoke with Shanthi and pt was never enrolled and currently is not eligible due to insurance.    Called pt and notified her the BP readings are not followed at [facility].   Pt then seemed confused about the BP machine and her PPM Home monitor.    Advised her that the PPM is being monitored at [facility] and to continue to sleep by it nightly.    Pt verbalizes understanding.

## 2024-06-13 DIAGNOSIS — I50.22 CHRONIC SYSTOLIC HEART FAILURE: ICD-10-CM

## 2024-06-13 RX ORDER — METOPROLOL SUCCINATE 25 MG/1
25 TABLET, EXTENDED RELEASE ORAL NIGHTLY
Qty: 90 TABLET | Refills: 3
Start: 2024-06-13

## 2024-06-13 NOTE — TELEPHONE ENCOUNTER
----- Message from Katherin Smith MA sent at 6/13/2024  9:30 AM CDT -----  The patient need refill it was on print  please sent is to [facility] pharmacy Metoprolol  succinate Toprol XL 25 MG . Thank you

## 2024-06-24 DIAGNOSIS — I50.22 CHRONIC SYSTOLIC HEART FAILURE: ICD-10-CM

## 2024-06-24 RX ORDER — METOPROLOL SUCCINATE 25 MG/1
25 TABLET, EXTENDED RELEASE ORAL NIGHTLY
Qty: 90 TABLET | Refills: 3 | Status: SHIPPED | OUTPATIENT
Start: 2024-06-24 | End: 2025-06-24

## 2024-09-09 ENCOUNTER — CLINICAL SUPPORT (OUTPATIENT)
Dept: CARDIOLOGY | Facility: HOSPITAL | Age: 79
End: 2024-09-09
Payer: COMMERCIAL

## 2024-09-09 DIAGNOSIS — Z95.0 PRESENCE OF CARDIAC PACEMAKER: ICD-10-CM

## 2024-09-09 DIAGNOSIS — I44.2 ATRIOVENTRICULAR BLOCK, COMPLETE: ICD-10-CM

## 2024-09-10 ENCOUNTER — CLINICAL SUPPORT (OUTPATIENT)
Dept: CARDIOLOGY | Facility: HOSPITAL | Age: 79
End: 2024-09-10
Attending: INTERNAL MEDICINE
Payer: COMMERCIAL

## 2024-09-10 DIAGNOSIS — Z95.0 PRESENCE OF CARDIAC PACEMAKER: ICD-10-CM

## 2024-09-10 DIAGNOSIS — I44.2 ATRIOVENTRICULAR BLOCK, COMPLETE: ICD-10-CM

## 2024-09-10 PROCEDURE — 93296 REM INTERROG EVL PM/IDS: CPT | Performed by: INTERNAL MEDICINE

## 2024-09-10 PROCEDURE — 93294 REM INTERROG EVL PM/LDLS PM: CPT | Mod: ,,, | Performed by: INTERNAL MEDICINE

## 2024-09-12 ENCOUNTER — OFFICE VISIT (OUTPATIENT)
Dept: CARDIOLOGY | Facility: CLINIC | Age: 79
End: 2024-09-12
Payer: COMMERCIAL

## 2024-09-12 VITALS
HEART RATE: 77 BPM | WEIGHT: 160.06 LBS | DIASTOLIC BLOOD PRESSURE: 68 MMHG | SYSTOLIC BLOOD PRESSURE: 104 MMHG | BODY MASS INDEX: 22.97 KG/M2

## 2024-09-12 DIAGNOSIS — I50.22 CHRONIC SYSTOLIC HEART FAILURE: Primary | ICD-10-CM

## 2024-09-12 DIAGNOSIS — Z95.0 CARDIAC RESYNCHRONIZATION THERAPY PACEMAKER (CRT-P) IN PLACE: ICD-10-CM

## 2024-09-12 DIAGNOSIS — I25.10 CORONARY ARTERY DISEASE INVOLVING NATIVE CORONARY ARTERY OF NATIVE HEART WITHOUT ANGINA PECTORIS: ICD-10-CM

## 2024-09-12 DIAGNOSIS — E78.00 PURE HYPERCHOLESTEROLEMIA: ICD-10-CM

## 2024-09-12 PROCEDURE — 1101F PT FALLS ASSESS-DOCD LE1/YR: CPT | Mod: CPTII,S$GLB,, | Performed by: INTERNAL MEDICINE

## 2024-09-12 PROCEDURE — 99213 OFFICE O/P EST LOW 20 MIN: CPT | Mod: S$GLB,,, | Performed by: INTERNAL MEDICINE

## 2024-09-12 PROCEDURE — 1126F AMNT PAIN NOTED NONE PRSNT: CPT | Mod: CPTII,S$GLB,, | Performed by: INTERNAL MEDICINE

## 2024-09-12 PROCEDURE — 99999 PR PBB SHADOW E&M-EST. PATIENT-LVL III: CPT | Mod: PBBFAC,,, | Performed by: INTERNAL MEDICINE

## 2024-09-12 PROCEDURE — 3078F DIAST BP <80 MM HG: CPT | Mod: CPTII,S$GLB,, | Performed by: INTERNAL MEDICINE

## 2024-09-12 PROCEDURE — 1159F MED LIST DOCD IN RCRD: CPT | Mod: CPTII,S$GLB,, | Performed by: INTERNAL MEDICINE

## 2024-09-12 PROCEDURE — 3288F FALL RISK ASSESSMENT DOCD: CPT | Mod: CPTII,S$GLB,, | Performed by: INTERNAL MEDICINE

## 2024-09-12 PROCEDURE — 3074F SYST BP LT 130 MM HG: CPT | Mod: CPTII,S$GLB,, | Performed by: INTERNAL MEDICINE

## 2024-09-12 NOTE — PROGRESS NOTES
"Subjective:   09/12/2024     Patient ID:  Ilene Mora is a 79 y.o. female who presents for evaulation of Follow-up      Comes in for cardiac follow-up. I had put her pacemaker in 10 years ago.  She had had complete heart block.  Due to right ventricular pacing, she developed a dilated cardiomyopathy and 3 months ago underwent upgrade to a biventricular pacemaker.  She is since noted pain around the pacemaker.  There has been no fever chills, no redness in the site.  Echocardiography subsequent to pacemaker implantation showed ejection fraction improvement to 45-50% in March 2023.  She is on guideline directed medical therapy for systolic heart failure including metoprolol, Entresto and Jardiance.  Repeat echocardiography in 8/23 showed normalization of left ventricular systolic function.  Now tolerating 1/2 Entresto b.i.d., 1 Jardiance daily and metoprolol 25 mg daily.  Echocardiography in May of this year showed normal LV function.      She does not have coronary artery disease, cardiac catheterization shows normal coronary arteries in 2021.    Hypertension appears to be well controlled on guideline directed medical therapy.      Hypercholesterolemia is treated with high-dose statin therapy.  See outside lab      Last Filed Vital Signs  - documented in this encounter  Last Filed Vital Signs  Vital Sign Reading Time Taken Comments  Blood Pressure 118/66 01/12/2023 12:59 PM CST    Pulse 62 01/12/2023 12:59 PM CST    Temperature - -    Respiratory Rate - -    Oxygen Saturation 98% 01/12/2023 12:59 PM CST    Inhaled Oxygen Concentration - -    Weight 71.8 kg (158 lb 6.4 oz) 01/12/2023 12:59 PM CST    Height 180.3 cm (5' 11") 01/12/2023 12:59 PM CST    Body Mass Index 22.09 01/12/2023 12:59 PM CST      Progress Notes  - documented in this encounter  Minda Carson MD - 01/12/2023 1:00 PM CST  Formatting of this note is different from the original.  CARDIOLOGY CLINIC NOTE   From 35% to  Date of Service: "   23    Chief Complaint:  Ilene Mora is a 77 y.o. who presents to clinic for follow up    History of Present Illness:  Ilene Mora presents today for routine follow up.     Ilene Mora presents today for routine follow up. With h/o chb/sp At which time she presented with ex intolerance Link LH and near syncope PPM .DD MR and LBBB chf ref      She had symtomatic complete heart block in  s/p PPM   In 2021 she had l  That led to pharm ett and cardiac echo showed DD, and mild MR  She then had cath 3/2021 No sig obstructive disease   Her patricia pep was 221   She then had sx of chf with link fatigue and Ex intolerance  t  \she had echo  with ef of 35 % secondary to lv dyschrony and rv pacing   bnp 2022 was 53  Then seen in chf clinic to initiate EBGMT On bb ARNI And tolerating meds   This led to Dual chamber BI ppm in 2022 as she had > 80 % rv pacing For LV dyschrony, LBBB chf an d worsening lv fx    She has been feeling so much better Stamina improved  No sx of link, pnd palpitations     PPM bi interrogated 2023nl threwholds  Batter > 9 yr nl fx     EKG today v paced        Past Medical History     Past Medical History:   Diagnosis Date   · Age-related macular degeneration   bilat   · Arthritis   hands   · Cardiac dysrhythmia   · Cataract   · Esophageal stricture   · GERD (gastroesophageal reflux disease)   · Heart block   · HLD (hyperlipidemia)   · Hypotension   · Mitral regurgitation   · Osteopenia   · Other seasonal allergic rhinitis   · Pacemaker   · SOB (shortness of breath)   · Trigeminal neuralgia     Past Surgical History:   Procedure Laterality Date   · CATARACT EXTRACTION, BILATERAL   ·  SECTION, CLASSIC   · CHOLECYSTECTOMY      · COLONOSCOPY   · DIAGNOSTIC EPS N/A 2022   Procedure: Implantation of LV-Lead  MEDTRONIC; Surgeon: Romero Burch MD; Location: [facility]; Service: Cardiology; Laterality: N/A;   · PACEMAKER INSERTION   · PARTIAL  "HYSTERECTOMY   · TONSILLECTOMY     Allergies   Allergen Reactions   · Sulfadiazine Rash and Swelling   · Prednisone Other (See Comments)   Not sure reaction, possibly fainted     Family History   Problem Relation Age of Onset   · Diabetes Mother   · Heart failure Mother   · Coronary art dis Father   · Cancer Father   · Hypertension Sister   · Osteoarthritis Sister   · Parkinsonism Sister   · Hypertension Brother   · Breast cancer Maternal Grandmother 55   passed 65     Review of Systems:  Constitutional: fatigue no fever  Skin: no rash. bruising, change in nail color  Eyes: no recent vision problems or eye pain no diplopia.  ENT:[occasional congestion,no ear pain, or sore throat.  Endocrine: no unusual hair growth, or intolerance to cold  Cardiovascular: as above]  Respiratory: occasional cough, occ shortness of breath, no wheezing  Gastrointestinal: no abdoinal pain, nausea, vomiting, or diarrhea. occ dyspepsia  Genitourinary: no dysuria.hematuria, polyuria  Musculoskeletal: occasional myalgias, arthralgias  Neurologic: no headache migraines,numbness  Hematologic: no unusual bruising or bleeding.  Psychiatric: anxiety depression   :  PHYSICAL EXAM  Vitals:   01/12/23 1259   BP: 118/66   Pulse: 62   SpO2: 98%   Weight: 71.8 kg (158 lb 6.4 oz)   Height: 1.803 m (5' 11")     Patient Vitals for the past 24 hrs:  Height Weight   01/12/23 1259 1.803 m (5' 11") 71.8 kg (158 lb 6.4 oz)     General well developed NAD  Head normal no sign of trauma  Eyes pupil equal no icterus  Mouth normal mucosa  Neck supple no jvd, carotid normal  Respiratory lungs clear bilaterally  Cardiac RRR murmur   Pulses normal   Abd soft non tender BS normal  Muscularskeletal normal range of motion , no edema  Neurological Alert oriented no deficets  Psychiatry mood normal coorperative  Skin no rash or lesions     Lab Results:  Lab Results   Component Value Date   CREATININE 0.83 12/02/2022   CREATININE 0.88 10/26/2022   CREATININE 0.97 (H) " 2022    2022    10/26/2022    2022   K 4.0 2022   K 4.3 10/26/2022   K 4.7 2022    2022    10/26/2022    2022   CO2 33 (H) 2022   CO2 27 10/26/2022   CO2 26 2022   BUN 14.0 2022   BUN 22 10/26/2022   BUN 18 2022     Lab Results   Component Value Date   AST 20 2022   AST 16 2022   ALT 17 2022   ALT 15 2022     Lab Results   Component Value Date   WBC 7.4 2022   WBC 5.3 2022   HGB 12.0 2022   HGB 12.5 2022   MCV 93.7 2022    2022    2022     No results found for: CHOL, HDL, LDL, TRIG, NONHDLC, LDLCALC, LDLDIRECT, LDLHDL, CHOLHDL]  Lab Results   Component Value Date   BNP 53 2022     Diagnostic Studies:  Reviewed by me .      2022  RECOMMENDATIONS:   Status post Medtronic Bi V pacemaker upgrade for complete heart block with congestive heart failure and worsening LV systolic function    No results found for this or any previous visit (from the past 4464 hour(s)).    Encounter Date: 22   Transthoracic Echocardiogram (TTE) Complete   Narrative   MMode/2D Measurements   IVSd: 0.99 cm LVIDd: 3.9 cm MVA(traced): 2.4 cm2  LVIDs: 2.7 cm  LVPWd: 0.90 cm  _________________________________________________________________________________  Ao root diam: 2.3 cm LVOT diam: 1.9 cm    Ao root area: 4.1 cm2 LVOT area: 2.9 cm2  LA dimension: 3.3 cm    Doppler Measurements   MV E max anjelica: 53.3 cm/sec MV dec slope: 188.4 cm/sec2 Ao V2 max: 139.8 cm/sec  MV A max anjelica: 85.8 cm/sec MV dec time: 0.31 sec Ao max P.8 mmHg  MV E/A: 0.62 Ao mean P.7 mmHg  Ao V2 VTI: 31.9 cm  MARYAN(I,D): 1.9 cm2    MARYAN(V,A): 1.9 cm2  MARYAN(V,D): 1.9 cm2    _________________________________________________________________________________  LV V1 max PG: 3.4 mmHg MR max anjelica: 456.3 cm/sec  AI dec slope: 180.4 cm/sec2 LV V1 mean P.7 mmHg MR max P.3 mmHg  LV V1  max: 92.6 cm/sec  LV V1 mean: 59.1 cm/sec  LV V1 VTI: 20.6 cm    _________________________________________________________________________________  SV(LVOT): 59.4 ml TR max jens: 203.3 cm/sec Lat Peak E' Jens: 5.1 cm/sec  Dimensionless Index: 0.64 TR max P.0 mmHg    _________________________________________________________________________________  E/E' Lateral: 10.4    Procedure Details:  A two-dimensional transthoracic echocardiogram with color flow and Doppler was performed. The study  was technically good with many images being of high quality.    Left Ventricle:  Left ventricular walls are upper limits of normal for size. Abnormal (paradoxical) septal motion  consistent with RV pacemaker . lv dyschrony. EF approx 35 %. Grade I diastolic dysfunction,  (abnormal relaxation pattern).    Left Atrium/Atrial Septum:  The left atrium is moderately dilated. An interatrial septal aneurysm is suggested.    Right Atrium:  There is a catheter/pacemaker lead seen in the RA cavity. The right atrium is mild to moderately  dilated.    Right Ventricle:  The right ventricle is normal in size and function. There is a pacemaker lead in the right  ventricle. There is no mass or thrombus in the right ventricle.    Aortic Valve:  There is mild aortic sclerosis. There is no aortic valvular vegetation. Mild aortic regurgitation.    Mitral Valve:  The mitral valve is normal in structure and function. There is no vegetation seen on the mitral  valve. There is mild to moderate mitral regurgitation.    Tricuspid Valve:  Anatomically normal tricuspid valve. There is no tricuspid valve vegetation. There is mild tricuspid  regurgitation.    Pulmonic Valve:  The pulmonic valve is not well visualized. Mild pulmonic valvular regurgitation.    Arteries:  The aortic root is normal size.    Venous:  IVC partially collapsed with inspiration, can't exclude elevated right sided pressures.    Pericardium/Pleura:  There is no pericardial  effusion.    Interpretation Summary  An interatrial septal aneurysm is suggested.  IVC partially collapsed with inspiration, can't exclude elevated right sided pressures.  There is no aortic valvular vegetation.  There is no vegetation seen on the mitral valve.  There is no tricuspid valve vegetation.  The pulmonic valve is not well visualized.  There is a pacemaker lead in the right ventricle.  There is no mass or thrombus in the right ventricle.  There is mild to moderate mitral regurgitation.  There is mild aortic sclerosis.  The left atrium is moderately dilated.  The right atrium is mild to moderately dilated.  Mild aortic regurgitation.  Grade I diastolic dysfunction, (abnormal relaxation pattern).  lv dyschrony  EF approx 35 %    ______________________________________________________________________________  Electronically signed by: Joanna Carson MD 11/03/2022 10:16 AM  Ordering Physician: JOANNA CARSON  Referring Physician: JAONNA CARSON  Performed By: Jessica Johnson        Current Medications:  Current Outpatient Medications on File Prior to Visit   Medication Sig Dispense Refill   · atorvastatin (LIPITOR) 40 MG tablet TAKE 1 TABLET EVERY DAY 90 tablet 0   · ENTRESTO 24-26 mg Tab tablet Take 1 tablet daily by mouth   · ibandronate (BONIVA) 150 mg tablet TAKE 1 TABLET EVERY MONTH. DO NOT LIE DOWN FOR AT LEAST 1 HOUR 3 tablet 0   · JARDIANCE 10 mg Tab tablet TAKE 1 TABLET EVERY MORNING 30 tablet 0   · metoprolol succinate (TOPROL XL) 25 MG 24 hr tablet TAKE 1 TABLET EVERY DAY 90 tablet 1   · vit A/vit C/vit E/zinc/copper (ICAPS AREDS ORAL) Take by mouth     No current facility-administered medications on file prior to visit.     PROBLEM LIST:     Encounter Diagnoses   Name Primary?   · Left ventricular dyssynchrony   · Nonrheumatic mitral valve regurgitation   · Pacemaker   · LBBB (left bundle branch block)   · Chronic systolic congestive heart failure (CMS/HCC)   · Stage 3a chronic kidney disease  (CMS/HCC)   · Coronary artery disease involving native coronary artery of native heart without angina pectoris     PLAN:     Name Primary?   · Left ventricular dyssynchrony  S/p bi PPM   · Nonrheumatic mitral valve regurgitation  monito   · Pacemaker   · LBBB (left bundle branch block)   · Chronic systolic congestive heart failure (CMS/HCC) c  clinically improved   tolerting meds And ed ordered   · Stage 3a chronic kidney disease (CMS/HCC)   · Coronary artery disease involving native coronary artery of native heart without angina pectoris     Prevention    Preventive education  For smokers: Educated and strongly counseled on smoking cessation.   Adopt a heart healthy diet  Counseled on various heart healthy diets  -Mediterranean diet- High in fruits vegetable, grains, fish, nuts and extra-virgin olive oil. Minimize processed, salty foods, packaged foods.  -Dash diet to lower HTN-Similar to mediterranean diet, low fat dairy, meatless meals and low sodium  -Plant based diet: Foundation being minimally processed and whole foods.   -Avoid foods in saturated fats and trans fats.   Aerobic exercise 30 minutes 5 times/ week.   Reduce and limit alcohol intake.   Learn stress management / relaxation techniques such as mindfulness and meditation.   Encourage self and family members to adopt healthy choices at a young age.          MMode/2D Measurements   IVSd: 0.95 cm                       LVIDd: 5.0 cm                 MVA(traced): 2.7 cm2                                       LVIDs: 3.5 cm                                       LVPWd: 1.2 cm             _________________________________________________________________________________   Ao root diam: 2.4 cm                LVOT diam: 2.3 cm   Ao root area: 4.4 cm2               LVOT area: 4.3 cm2   LA dimension: 3.8 cm     Doppler Measurements   MV E max anjelica: 56.1 cm/sec                                             Ao V2 max: 112.4 cm/sec   MV A max anjelica: 76.9 cm/sec           MV  dec slope: 198.9 cm/sec2       Ao max P.1 mmHg   MV E/A: 0.73                        MV dec time: 0.28 sec             Ao V2 mean: 75.6 cm/sec                                                                         Ao mean P.6 mmHg                                                                         Ao V2 VTI: 23.4 cm               _________________________________________________________________________________                                       MR max jens: 460.4 cm/sec          TR max jens: 212.8 cm/sec   AI dec slope: 109.1 cm/sec2         MR max P.9 mmHg              TR max P.2 mmHg               _________________________________________________________________________________   Lat Peak E' Jens: 11.0 cm/sec        E/E' Lateral: 5.1     Procedure Details:   A two-dimensional transthoracic echocardiogram with color flow and Doppler was performed. The study   was technically good with many images being of high quality.     Left Ventricle:   The left ventricular size, thickness and function are normal. Abnormal (paradoxical) septal motion   consistent with RV pacemaker . Ejection Fraction = 45-50%. Left ventricular systolic function is   mildly reduced. Grade I diastolic dysfunction, (abnormal relaxation pattern).     Left Atrium/Atrial Septum:   The left atrial size is normal.     Right Atrium:   Right atrial size is normal.     Right Ventricle:   The right ventricle is normal in size and function. There is a pacemaker lead in the right   ventricle. The right ventricular systolic function is normal.     Aortic Valve:   Anatomically normal aortic valve. No hemodynamically significant valvular aortic stenosis. Trace   (trivial) aortic regurgitation.     Mitral Valve:   The mitral valve is normal in structure and function. There is mild mitral regurgitation.     Tricuspid Valve:   Anatomically normal tricuspid valve. Peak PA systolic pressure is normal. There is mild tricuspid   regurgitation.      Pulmonic Valve:   Anatomically normal pulmonic valve. There is no pulmonic valvular regurgitation.     Arteries:   The aortic root is normal size.     Venous:   The inferior vena cava is normal in size, with a normal collapsibility index.     Pericardium/Pleura:   There is no pericardial effusion.     Interpretation Summary   Left ventricular systolic function is mildly reduced.   Ejection Fraction = 45-50%.   Grade I diastolic dysfunction, (abnormal relaxation pattern).   Abnormal (paradoxical) septal motion consistent with RV pacemaker .   Trace (trivial) aortic regurgitation.   There is mild mitral regurgitation.   There is mild tricuspid regurgitation.   The inferior vena cava is normal in size, with a normal collapsibility index.   ______________________________________________________________________________   Electronically signed by:               Ala Mohsen 03/31/2023 03:50 PM   Ordering Physician: Minda Carson MD   Referring Physician: Minda Carson MD   Performed By: Maryellen Haines  Procedure Note    Mohsen, Ala Mousa, MD - 03/31/2023   Formatting of this note might be different from the original.            Past Medical History:   Diagnosis Date    CHF (congestive heart failure)     Coronary artery disease     Hyperlipidemia        Review of patient's allergies indicates:   Allergen Reactions    Sulfadiazine      Other reaction(s): flushed, swelling         Current Outpatient Medications:     atorvastatin (LIPITOR) 40 MG tablet, Take 40 mg by mouth once daily., Disp: , Rfl:     ENTRESTO 24-26 mg per tablet, Take 0.5 tablets by mouth 2 (two) times daily., Disp: , Rfl:     ibandronate (BONIVA) 150 mg tablet, Take 150 mg by mouth every 30 days., Disp: , Rfl:     JARDIANCE 10 mg tablet, Take 5 mg by mouth once daily. 1/2 tab daily, Disp: , Rfl:     metoprolol succinate (TOPROL-XL) 25 MG 24 hr tablet, Take 1 tablet (25 mg total) by mouth every evening., Disp: 90 tablet, Rfl: 3    vit A/vit C/vit  E/zinc/copper (ICAPS AREDS ORAL), Take by mouth Daily., Disp: , Rfl:      Objective:   Review of Systems   Cardiovascular:  Negative for chest pain, claudication, cyanosis, dyspnea on exertion, irregular heartbeat, leg swelling, near-syncope, orthopnea, palpitations, paroxysmal nocturnal dyspnea and syncope.         Vitals:    09/12/24 1058   BP: 104/68   Pulse: 77       Wt Readings from Last 3 Encounters:   09/12/24 72.6 kg (160 lb 0.9 oz)   05/10/24 69.9 kg (154 lb)   03/12/24 70.1 kg (154 lb 8.7 oz)     Temp Readings from Last 3 Encounters:   No data found for Temp     BP Readings from Last 3 Encounters:   09/12/24 104/68   05/10/24 110/60   03/12/24 101/64     Pulse Readings from Last 3 Encounters:   09/12/24 77   05/10/24 70   03/12/24 69             Physical Exam  Vitals reviewed.   Constitutional:       General: She is not in acute distress.     Appearance: She is well-developed.   HENT:      Head: Normocephalic and atraumatic.      Nose: Nose normal.   Eyes:      Conjunctiva/sclera: Conjunctivae normal.      Pupils: Pupils are equal, round, and reactive to light.   Neck:      Vascular: No carotid bruit or JVD.   Cardiovascular:      Rate and Rhythm: Normal rate and regular rhythm.      Pulses: Normal pulses and intact distal pulses.      Heart sounds: Normal heart sounds. No murmur heard.     No friction rub. No gallop.   Pulmonary:      Effort: Pulmonary effort is normal. No respiratory distress.      Breath sounds: Normal breath sounds. No wheezing or rales.   Chest:      Chest wall: No tenderness.   Abdominal:      General: Bowel sounds are normal. There is no distension.      Palpations: Abdomen is soft.      Tenderness: There is no abdominal tenderness.   Musculoskeletal:         General: No tenderness or deformity. Normal range of motion.        Arms:       Cervical back: Normal range of motion and neck supple.      Right lower leg: No edema.      Left lower leg: No edema.   Skin:     General: Skin is  "warm and dry.      Findings: No erythema or rash.   Neurological:      Mental Status: She is alert and oriented to person, place, and time.      Cranial Nerves: No cranial nerve deficit.      Motor: No abnormal muscle tone.      Coordination: Coordination normal.   Psychiatric:         Behavior: Behavior normal.         Thought Content: Thought content normal.         Judgment: Judgment normal.           No results found for: "CHOL"  No results found for: "HDL"  No results found for: "LDLCALC"  Lab Results   Component Value Date    ALT 16 06/20/2024    AST 18 06/20/2024    AST 20 02/21/2024    AST 15 12/11/2023     Lab Results   Component Value Date    CREATININE 0.91 06/20/2024    BUN 16 06/20/2024     06/20/2024    K 4.6 06/20/2024    CO2 30 06/20/2024    CO2 27 02/21/2024    CO2 29 12/11/2023     No results found for: "HGB", "HCT"            EKG shows sinus rhythm with biventricular paced rhythm          Assessment and Plan:     Chronic systolic heart failure  Comments:  Compensated    Coronary artery disease involving native coronary artery of native heart without angina pectoris  Comments:  Asymptomatic    Cardiac resynchronization therapy pacemaker (CRT-P) in place  Comments:  Normal pacemaker function, 8 years left on battery    Pure hypercholesterolemia  Comments:  Continue statin             Follow up in about 1 year (around 9/12/2025).          No future appointments.                    "

## 2024-10-16 RX ORDER — SACUBITRIL AND VALSARTAN 24; 26 MG/1; MG/1
1 TABLET, FILM COATED ORAL 2 TIMES DAILY
Qty: 60 TABLET | Refills: 11 | Status: SHIPPED | OUTPATIENT
Start: 2024-10-16

## 2024-12-10 ENCOUNTER — CLINICAL SUPPORT (OUTPATIENT)
Dept: CARDIOLOGY | Facility: HOSPITAL | Age: 79
End: 2024-12-10
Attending: INTERNAL MEDICINE
Payer: COMMERCIAL

## 2024-12-10 ENCOUNTER — CLINICAL SUPPORT (OUTPATIENT)
Dept: CARDIOLOGY | Facility: HOSPITAL | Age: 79
End: 2024-12-10
Payer: COMMERCIAL

## 2024-12-10 DIAGNOSIS — I44.2 ATRIOVENTRICULAR BLOCK, COMPLETE: ICD-10-CM

## 2024-12-10 DIAGNOSIS — Z95.0 PRESENCE OF CARDIAC PACEMAKER: ICD-10-CM

## 2024-12-10 PROCEDURE — 93296 REM INTERROG EVL PM/IDS: CPT | Performed by: INTERNAL MEDICINE

## 2024-12-10 PROCEDURE — 93294 REM INTERROG EVL PM/LDLS PM: CPT | Mod: ,,, | Performed by: INTERNAL MEDICINE

## 2025-02-17 ENCOUNTER — TELEPHONE (OUTPATIENT)
Dept: CARDIOLOGY | Facility: CLINIC | Age: 80
End: 2025-02-17
Payer: MEDICARE

## 2025-02-27 ENCOUNTER — OFFICE VISIT (OUTPATIENT)
Dept: CARDIOLOGY | Facility: CLINIC | Age: 80
End: 2025-02-27
Payer: MEDICARE

## 2025-02-27 VITALS
SYSTOLIC BLOOD PRESSURE: 124 MMHG | BODY MASS INDEX: 23.5 KG/M2 | DIASTOLIC BLOOD PRESSURE: 77 MMHG | HEART RATE: 66 BPM | WEIGHT: 163.81 LBS

## 2025-02-27 DIAGNOSIS — Z95.0 CARDIAC RESYNCHRONIZATION THERAPY PACEMAKER (CRT-P) IN PLACE: ICD-10-CM

## 2025-02-27 DIAGNOSIS — E78.00 PURE HYPERCHOLESTEROLEMIA: ICD-10-CM

## 2025-02-27 DIAGNOSIS — I44.2 ATRIOVENTRICULAR BLOCK, COMPLETE: ICD-10-CM

## 2025-02-27 DIAGNOSIS — R55 SYNCOPE AND COLLAPSE: Primary | ICD-10-CM

## 2025-02-27 DIAGNOSIS — I25.10 CORONARY ARTERY DISEASE INVOLVING NATIVE CORONARY ARTERY OF NATIVE HEART WITHOUT ANGINA PECTORIS: ICD-10-CM

## 2025-02-27 DIAGNOSIS — I50.22 CHRONIC SYSTOLIC HEART FAILURE: ICD-10-CM

## 2025-02-27 PROCEDURE — 1159F MED LIST DOCD IN RCRD: CPT | Mod: CPTII,S$GLB,, | Performed by: INTERNAL MEDICINE

## 2025-02-27 PROCEDURE — 3074F SYST BP LT 130 MM HG: CPT | Mod: CPTII,S$GLB,, | Performed by: INTERNAL MEDICINE

## 2025-02-27 PROCEDURE — 99999 PR PBB SHADOW E&M-EST. PATIENT-LVL III: CPT | Mod: PBBFAC,,, | Performed by: INTERNAL MEDICINE

## 2025-02-27 PROCEDURE — 3288F FALL RISK ASSESSMENT DOCD: CPT | Mod: CPTII,S$GLB,, | Performed by: INTERNAL MEDICINE

## 2025-02-27 PROCEDURE — 1126F AMNT PAIN NOTED NONE PRSNT: CPT | Mod: CPTII,S$GLB,, | Performed by: INTERNAL MEDICINE

## 2025-02-27 PROCEDURE — 3078F DIAST BP <80 MM HG: CPT | Mod: CPTII,S$GLB,, | Performed by: INTERNAL MEDICINE

## 2025-02-27 PROCEDURE — 1101F PT FALLS ASSESS-DOCD LE1/YR: CPT | Mod: CPTII,S$GLB,, | Performed by: INTERNAL MEDICINE

## 2025-02-27 PROCEDURE — 99214 OFFICE O/P EST MOD 30 MIN: CPT | Mod: S$GLB,,, | Performed by: INTERNAL MEDICINE

## 2025-02-27 NOTE — PROGRESS NOTES
"Subjective:   02/27/2025     Patient ID:  Ilene Mora is a 79 y.o. female who presents for evaulation of Follow-up (Hypotension (80/46), fatigued)      Comes in for cardiac follow-up.  She had an episode of syncope.  Her blood pressures have been low.    I had put her pacemaker in 10 years ago.  She had had complete heart block.  Due to right ventricular pacing, she developed a dilated cardiomyopathy and 2 years ago underwent upgrade to a biventricular pacemaker.      Echocardiography subsequent to pacemaker implantation showed ejection fraction improvement to to normal last year.  She is on guideline directed medical therapy for systolic heart failure including metoprolol, Entresto and Jardiance.  Repeat echocardiography in 8/23 showed normalization of left ventricular systolic function.  Now tolerating 1/2 Entresto b.i.d., 1 Jardiance daily and metoprolol 25 mg daily.     She does not have coronary artery disease, cardiac catheterization shows normal coronary arteries in 2021.    Hypertension appears to be well controlled on guideline directed medical therapy.      Hypercholesterolemia is treated with high-dose statin therapy.  See outside lab      Last Filed Vital Signs  - documented in this encounter  Last Filed Vital Signs  Vital Sign Reading Time Taken Comments  Blood Pressure 118/66 01/12/2023 12:59 PM CST    Pulse 62 01/12/2023 12:59 PM CST    Temperature - -    Respiratory Rate - -    Oxygen Saturation 98% 01/12/2023 12:59 PM CST    Inhaled Oxygen Concentration - -    Weight 71.8 kg (158 lb 6.4 oz) 01/12/2023 12:59 PM CST    Height 180.3 cm (5' 11") 01/12/2023 12:59 PM CST    Body Mass Index 22.09 01/12/2023 12:59 PM CST      Progress Notes  - documented in this encounter  Minda Carson MD - 01/12/2023 1:00 PM CST  Formatting of this note is different from the original.  CARDIOLOGY CLINIC NOTE   From 35% to  Date of Service:   01/12/23    Chief Complaint:  Ilene Mora is a 77 y.o. who presents to " clinic for follow up    History of Present Illness:  Ilene Mora presents today for routine follow up.     Ilene Mora presents today for routine follow up. With h/o chb/sp At which time she presented with ex intolerance Link LH and near syncope PPM .DD MR and LBBB chf ref      She had symtomatic complete heart block in  s/p PPM   In 2021 she had l  That led to pharm ett and cardiac echo showed DD, and mild MR  She then had cath 3/2021 No sig obstructive disease   Her patricia pep was 221   She then had sx of chf with link fatigue and Ex intolerance  t  \she had echo  with ef of 35 % secondary to lv dyschrony and rv pacing   bnp 2022 was 53  Then seen in chf clinic to initiate EBGMT On bb ARNI And tolerating meds   This led to Dual chamber BI ppm in 2022 as she had > 80 % rv pacing For LV dyschrony, LBBB chf an d worsening lv fx    She has been feeling so much better Stamina improved  No sx of link, pnd palpitations     PPM bi interrogated 2023nl threwholds  Batter > 9 yr nl fx     EKG today v paced        Past Medical History     Past Medical History:   Diagnosis Date   · Age-related macular degeneration   bilat   · Arthritis   hands   · Cardiac dysrhythmia   · Cataract   · Esophageal stricture   · GERD (gastroesophageal reflux disease)   · Heart block   · HLD (hyperlipidemia)   · Hypotension   · Mitral regurgitation   · Osteopenia   · Other seasonal allergic rhinitis   · Pacemaker   · SOB (shortness of breath)   · Trigeminal neuralgia     Past Surgical History:   Procedure Laterality Date   · CATARACT EXTRACTION, BILATERAL   ·  SECTION, CLASSIC   · CHOLECYSTECTOMY      · COLONOSCOPY   · DIAGNOSTIC EPS N/A 2022   Procedure: Implantation of LV-Lead  MEDTRONIC; Surgeon: Romero Burch MD; Location: [facility]; Service: Cardiology; Laterality: N/A;   · PACEMAKER INSERTION   · PARTIAL HYSTERECTOMY   · TONSILLECTOMY     Allergies   Allergen Reactions   ·  "Sulfadiazine Rash and Swelling   · Prednisone Other (See Comments)   Not sure reaction, possibly fainted     Family History   Problem Relation Age of Onset   · Diabetes Mother   · Heart failure Mother   · Coronary art dis Father   · Cancer Father   · Hypertension Sister   · Osteoarthritis Sister   · Parkinsonism Sister   · Hypertension Brother   · Breast cancer Maternal Grandmother 55   passed 65     Review of Systems:  Constitutional: fatigue no fever  Skin: no rash. bruising, change in nail color  Eyes: no recent vision problems or eye pain no diplopia.  ENT:[occasional congestion,no ear pain, or sore throat.  Endocrine: no unusual hair growth, or intolerance to cold  Cardiovascular: as above]  Respiratory: occasional cough, occ shortness of breath, no wheezing  Gastrointestinal: no abdoinal pain, nausea, vomiting, or diarrhea. occ dyspepsia  Genitourinary: no dysuria.hematuria, polyuria  Musculoskeletal: occasional myalgias, arthralgias  Neurologic: no headache migraines,numbness  Hematologic: no unusual bruising or bleeding.  Psychiatric: anxiety depression   :  PHYSICAL EXAM  Vitals:   01/12/23 1259   BP: 118/66   Pulse: 62   SpO2: 98%   Weight: 71.8 kg (158 lb 6.4 oz)   Height: 1.803 m (5' 11")     Patient Vitals for the past 24 hrs:  Height Weight   01/12/23 1259 1.803 m (5' 11") 71.8 kg (158 lb 6.4 oz)     General well developed NAD  Head normal no sign of trauma  Eyes pupil equal no icterus  Mouth normal mucosa  Neck supple no jvd, carotid normal  Respiratory lungs clear bilaterally  Cardiac RRR murmur   Pulses normal   Abd soft non tender BS normal  Muscularskeletal normal range of motion , no edema  Neurological Alert oriented no deficets  Psychiatry mood normal coorperative  Skin no rash or lesions     Lab Results:  Lab Results   Component Value Date   CREATININE 0.83 12/02/2022   CREATININE 0.88 10/26/2022   CREATININE 0.97 (H) 06/09/2022    12/02/2022    10/26/2022    06/09/2022   K " 4.0 2022   K 4.3 10/26/2022   K 4.7 2022    2022    10/26/2022    2022   CO2 33 (H) 2022   CO2 27 10/26/2022   CO2 26 2022   BUN 14.0 2022   BUN 22 10/26/2022   BUN 18 2022     Lab Results   Component Value Date   AST 20 2022   AST 16 2022   ALT 17 2022   ALT 15 2022     Lab Results   Component Value Date   WBC 7.4 2022   WBC 5.3 2022   HGB 12.0 2022   HGB 12.5 2022   MCV 93.7 2022    2022    2022     No results found for: CHOL, HDL, LDL, TRIG, NONHDLC, LDLCALC, LDLDIRECT, LDLHDL, CHOLHDL]  Lab Results   Component Value Date   BNP 53 2022     Diagnostic Studies:  Reviewed by me .      2022  RECOMMENDATIONS:   Status post Medtr[employer] Bi V pacemaker upgrade for complete heart block with congestive heart failure and worsening LV systolic function    No results found for this or any previous visit (from the past 4464 hour(s)).    Encounter Date: 22   Transthoracic Echocardiogram (TTE) Complete   Narrative   MMode/2D Measurements   IVSd: 0.99 cm LVIDd: 3.9 cm MVA(traced): 2.4 cm2  LVIDs: 2.7 cm  LVPWd: 0.90 cm  _________________________________________________________________________________  Ao root diam: 2.3 cm LVOT diam: 1.9 cm    Ao root area: 4.1 cm2 LVOT area: 2.9 cm2  LA dimension: 3.3 cm    Doppler Measurements   MV E max anjelica: 53.3 cm/sec MV dec slope: 188.4 cm/sec2 Ao V2 max: 139.8 cm/sec  MV A max anjelica: 85.8 cm/sec MV dec time: 0.31 sec Ao max P.8 mmHg  MV E/A: 0.62 Ao mean P.7 mmHg  Ao V2 VTI: 31.9 cm  MARYAN(I,D): 1.9 cm2    MARYAN(V,A): 1.9 cm2  MARYAN(V,D): 1.9 cm2    _________________________________________________________________________________  LV V1 max PG: 3.4 mmHg MR max anjelica: 456.3 cm/sec  AI dec slope: 180.4 cm/sec2 LV V1 mean P.7 mmHg MR max P.3 mmHg  LV V1 max: 92.6 cm/sec  LV V1 mean: 59.1 cm/sec  LV V1 VTI: 20.6  cm    _________________________________________________________________________________  SV(LVOT): 59.4 ml TR max jens: 203.3 cm/sec Lat Peak E' Jens: 5.1 cm/sec  Dimensionless Index: 0.64 TR max P.0 mmHg    _________________________________________________________________________________  E/E' Lateral: 10.4    Procedure Details:  A two-dimensional transthoracic echocardiogram with color flow and Doppler was performed. The study  was technically good with many images being of high quality.    Left Ventricle:  Left ventricular walls are upper limits of normal for size. Abnormal (paradoxical) septal motion  consistent with RV pacemaker . lv dyschrony. EF approx 35 %. Grade I diastolic dysfunction,  (abnormal relaxation pattern).    Left Atrium/Atrial Septum:  The left atrium is moderately dilated. An interatrial septal aneurysm is suggested.    Right Atrium:  There is a catheter/pacemaker lead seen in the RA cavity. The right atrium is mild to moderately  dilated.    Right Ventricle:  The right ventricle is normal in size and function. There is a pacemaker lead in the right  ventricle. There is no mass or thrombus in the right ventricle.    Aortic Valve:  There is mild aortic sclerosis. There is no aortic valvular vegetation. Mild aortic regurgitation.    Mitral Valve:  The mitral valve is normal in structure and function. There is no vegetation seen on the mitral  valve. There is mild to moderate mitral regurgitation.    Tricuspid Valve:  Anatomically normal tricuspid valve. There is no tricuspid valve vegetation. There is mild tricuspid  regurgitation.    Pulmonic Valve:  The pulmonic valve is not well visualized. Mild pulmonic valvular regurgitation.    Arteries:  The aortic root is normal size.    Venous:  IVC partially collapsed with inspiration, can't exclude elevated right sided pressures.    Pericardium/Pleura:  There is no pericardial effusion.    Interpretation Summary  An interatrial septal aneurysm is  suggested.  IVC partially collapsed with inspiration, can't exclude elevated right sided pressures.  There is no aortic valvular vegetation.  There is no vegetation seen on the mitral valve.  There is no tricuspid valve vegetation.  The pulmonic valve is not well visualized.  There is a pacemaker lead in the right ventricle.  There is no mass or thrombus in the right ventricle.  There is mild to moderate mitral regurgitation.  There is mild aortic sclerosis.  The left atrium is moderately dilated.  The right atrium is mild to moderately dilated.  Mild aortic regurgitation.  Grade I diastolic dysfunction, (abnormal relaxation pattern).  lv dyschrony  EF approx 35 %    ______________________________________________________________________________  Electronically signed by: Joanna Carson MD 11/03/2022 10:16 AM  Ordering Physician: JOANNA CARSON  Referring Physician: JOANNA CARSON  Performed By: Jessica Johnson        Current Medications:  Current Outpatient Medications on File Prior to Visit   Medication Sig Dispense Refill   · atorvastatin (LIPITOR) 40 MG tablet TAKE 1 TABLET EVERY DAY 90 tablet 0   · ENTRESTO 24-26 mg Tab tablet Take 1 tablet daily by mouth   · ibandronate (BONIVA) 150 mg tablet TAKE 1 TABLET EVERY MONTH. DO NOT LIE DOWN FOR AT LEAST 1 HOUR 3 tablet 0   · JARDIANCE 10 mg Tab tablet TAKE 1 TABLET EVERY MORNING 30 tablet 0   · metoprolol succinate (TOPROL XL) 25 MG 24 hr tablet TAKE 1 TABLET EVERY DAY 90 tablet 1   · vit A/vit C/vit E/zinc/copper (ICAPS AREDS ORAL) Take by mouth     No current facility-administered medications on file prior to visit.     PROBLEM LIST:     Encounter Diagnoses   Name Primary?   · Left ventricular dyssynchrony   · Nonrheumatic mitral valve regurgitation   · Pacemaker   · LBBB (left bundle branch block)   · Chronic systolic congestive heart failure (CMS/HCC)   · Stage 3a chronic kidney disease (CMS/HCC)   · Coronary artery disease involving native coronary artery  of native heart without angina pectoris     PLAN:     Name Primary?   · Left ventricular dyssynchrony  S/p bi PPM   · Nonrheumatic mitral valve regurgitation  monito   · Pacemaker   · LBBB (left bundle branch block)   · Chronic systolic congestive heart failure (CMS/HCC) c  clinically improved   tolerting meds And ed ordered   · Stage 3a chronic kidney disease (CMS/HCC)   · Coronary artery disease involving native coronary artery of native heart without angina pectoris     Prevention    Preventive education  For smokers: Educated and strongly counseled on smoking cessation.   Adopt a heart healthy diet  Counseled on various heart healthy diets  -Mediterranean diet- High in fruits vegetable, grains, fish, nuts and extra-virgin olive oil. Minimize processed, salty foods, packaged foods.  -Dash diet to lower HTN-Similar to mediterranean diet, low fat dairy, meatless meals and low sodium  -Plant based diet: Foundation being minimally processed and whole foods.   -Avoid foods in saturated fats and trans fats.   Aerobic exercise 30 minutes 5 times/ week.   Reduce and limit alcohol intake.   Learn stress management / relaxation techniques such as mindfulness and meditation.   Encourage self and family members to adopt healthy choices at a young age.          MMode/2D Measurements   IVSd: 0.95 cm                       LVIDd: 5.0 cm                 MVA(traced): 2.7 cm2                                       LVIDs: 3.5 cm                                       LVPWd: 1.2 cm             _________________________________________________________________________________   Ao root diam: 2.4 cm                LVOT diam: 2.3 cm   Ao root area: 4.4 cm2               LVOT area: 4.3 cm2   LA dimension: 3.8 cm     Doppler Measurements   MV E max anjelica: 56.1 cm/sec                                             Ao V2 max: 112.4 cm/sec   MV A max anjelica: 76.9 cm/sec           MV dec slope: 198.9 cm/sec2       Ao max P.1 mmHg   MV E/A: 0.73                         MV dec time: 0.28 sec             Ao V2 mean: 75.6 cm/sec                                                                         Ao mean P.6 mmHg                                                                         Ao V2 VTI: 23.4 cm               _________________________________________________________________________________                                       MR max jens: 460.4 cm/sec          TR max jens: 212.8 cm/sec   AI dec slope: 109.1 cm/sec2         MR max P.9 mmHg              TR max P.2 mmHg               _________________________________________________________________________________   Lat Peak E' Jens: 11.0 cm/sec        E/E' Lateral: 5.1     Procedure Details:   A two-dimensional transthoracic echocardiogram with color flow and Doppler was performed. The study   was technically good with many images being of high quality.     Left Ventricle:   The left ventricular size, thickness and function are normal. Abnormal (paradoxical) septal motion   consistent with RV pacemaker . Ejection Fraction = 45-50%. Left ventricular systolic function is   mildly reduced. Grade I diastolic dysfunction, (abnormal relaxation pattern).     Left Atrium/Atrial Septum:   The left atrial size is normal.     Right Atrium:   Right atrial size is normal.     Right Ventricle:   The right ventricle is normal in size and function. There is a pacemaker lead in the right   ventricle. The right ventricular systolic function is normal.     Aortic Valve:   Anatomically normal aortic valve. No hemodynamically significant valvular aortic stenosis. Trace   (trivial) aortic regurgitation.     Mitral Valve:   The mitral valve is normal in structure and function. There is mild mitral regurgitation.     Tricuspid Valve:   Anatomically normal tricuspid valve. Peak PA systolic pressure is normal. There is mild tricuspid   regurgitation.     Pulmonic Valve:   Anatomically normal pulmonic valve. There is no  pulmonic valvular regurgitation.     Arteries:   The aortic root is normal size.     Venous:   The inferior vena cava is normal in size, with a normal collapsibility index.     Pericardium/Pleura:   There is no pericardial effusion.     Interpretation Summary   Left ventricular systolic function is mildly reduced.   Ejection Fraction = 45-50%.   Grade I diastolic dysfunction, (abnormal relaxation pattern).   Abnormal (paradoxical) septal motion consistent with RV pacemaker .   Trace (trivial) aortic regurgitation.   There is mild mitral regurgitation.   There is mild tricuspid regurgitation.   The inferior vena cava is normal in size, with a normal collapsibility index.   ______________________________________________________________________________   Electronically signed by:               Ala Mohsen 03/31/2023 03:50 PM   Ordering Physician: Minda Carson MD   Referring Physician: Minda Carson MD   Performed By: Maryellen Haines  Procedure Note    Mohsen, Ala Mousa, MD - 03/31/2023   Formatting of this note might be different from the original.            Past Medical History:   Diagnosis Date    CHF (congestive heart failure)     Coronary artery disease     Hyperlipidemia        Review of patient's allergies indicates:   Allergen Reactions    Sulfadiazine      Other reaction(s): flushed, swelling         Current Outpatient Medications:     atorvastatin (LIPITOR) 40 MG tablet, Take 40 mg by mouth once daily., Disp: , Rfl:     metoprolol succinate (TOPROL-XL) 25 MG 24 hr tablet, Take 1 tablet (25 mg total) by mouth every evening., Disp: 90 tablet, Rfl: 3    sacubitriL-valsartan (ENTRESTO) 24-26 mg per tablet, Take 1 tablet by mouth 2 (two) times daily., Disp: 60 tablet, Rfl: 11    vit A/vit C/vit E/zinc/copper (ICAPS AREDS ORAL), Take by mouth Daily., Disp: , Rfl:     ibandronate (BONIVA) 150 mg tablet, Take 150 mg by mouth every 30 days. (Patient not taking: Reported on 2/27/2025), Disp: , Rfl:      Objective:    Review of Systems   Cardiovascular:  Negative for chest pain, claudication, cyanosis, dyspnea on exertion, irregular heartbeat, leg swelling, near-syncope, orthopnea, palpitations, paroxysmal nocturnal dyspnea and syncope.         Vitals:    02/27/25 1115   BP: 124/77   Pulse: 66       Wt Readings from Last 3 Encounters:   02/27/25 74.3 kg (163 lb 12.8 oz)   09/12/24 72.6 kg (160 lb 0.9 oz)   05/10/24 69.9 kg (154 lb)     Temp Readings from Last 3 Encounters:   No data found for Temp     BP Readings from Last 3 Encounters:   02/27/25 124/77   09/12/24 104/68   05/10/24 110/60     Pulse Readings from Last 3 Encounters:   02/27/25 66   09/12/24 77   05/10/24 70             Physical Exam  Vitals reviewed.   Constitutional:       General: She is not in acute distress.     Appearance: She is well-developed.   HENT:      Head: Normocephalic and atraumatic.      Nose: Nose normal.   Eyes:      Conjunctiva/sclera: Conjunctivae normal.      Pupils: Pupils are equal, round, and reactive to light.   Neck:      Vascular: No carotid bruit or JVD.   Cardiovascular:      Rate and Rhythm: Normal rate and regular rhythm.      Pulses: Normal pulses and intact distal pulses.      Heart sounds: Normal heart sounds. No murmur heard.     No friction rub. No gallop.   Pulmonary:      Effort: Pulmonary effort is normal. No respiratory distress.      Breath sounds: Normal breath sounds. No wheezing or rales.   Chest:      Chest wall: No tenderness.   Abdominal:      General: Bowel sounds are normal. There is no distension.      Palpations: Abdomen is soft.      Tenderness: There is no abdominal tenderness.   Musculoskeletal:         General: No tenderness or deformity. Normal range of motion.        Arms:       Cervical back: Normal range of motion and neck supple.      Right lower leg: No edema.      Left lower leg: No edema.   Skin:     General: Skin is warm and dry.      Findings: No erythema or rash.   Neurological:      Mental  "Status: She is alert and oriented to person, place, and time.      Cranial Nerves: No cranial nerve deficit.      Motor: No abnormal muscle tone.      Coordination: Coordination normal.   Psychiatric:         Behavior: Behavior normal.         Thought Content: Thought content normal.         Judgment: Judgment normal.           No results found for: "CHOL"  No results found for: "HDL"  No results found for: "LDLCALC"  Lab Results   Component Value Date    ALT 15 12/11/2024    AST 17 12/11/2024    AST 18 06/20/2024    AST 20 02/21/2024     Lab Results   Component Value Date    CREATININE 0.87 12/11/2024    BUN 14 12/11/2024     12/11/2024    K 4.4 12/11/2024    CO2 29 12/11/2024    CO2 30 06/20/2024    CO2 27 02/21/2024     No results found for: "HGB", "HCT"            EKG shows sinus rhythm with biventricular paced rhythm          Assessment and Plan:     Syncope and collapse  Comments:  Symptomatic hypotension   Discontinue Jardiance    Atrioventricular block, complete  Comments:  Status post pacemaker    Chronic systolic heart failure  Comments:  Clinically stable  Orders:  -     Echo; Future; Expected date: 05/27/2025    Coronary artery disease involving native coronary artery of native heart without angina pectoris    Pure hypercholesterolemia  Comments:  Well controlled on high-dose statin    Cardiac resynchronization therapy pacemaker (CRT-P) in place  -     Echo; Future; Expected date: 05/27/2025             Follow up in about 3 months (around 5/27/2025).  With echocardiogram          No future appointments.                      "

## 2025-03-11 ENCOUNTER — CLINICAL SUPPORT (OUTPATIENT)
Dept: CARDIOLOGY | Facility: HOSPITAL | Age: 80
End: 2025-03-11
Payer: MEDICARE

## 2025-03-11 ENCOUNTER — CLINICAL SUPPORT (OUTPATIENT)
Dept: CARDIOLOGY | Facility: HOSPITAL | Age: 80
End: 2025-03-11
Attending: INTERNAL MEDICINE
Payer: MEDICARE

## 2025-03-11 DIAGNOSIS — I44.2 ATRIOVENTRICULAR BLOCK, COMPLETE: ICD-10-CM

## 2025-03-11 DIAGNOSIS — Z95.0 PRESENCE OF CARDIAC PACEMAKER: ICD-10-CM

## 2025-03-11 PROCEDURE — 93296 REM INTERROG EVL PM/IDS: CPT | Performed by: INTERNAL MEDICINE

## 2025-03-11 PROCEDURE — 93294 REM INTERROG EVL PM/LDLS PM: CPT | Mod: ,,, | Performed by: INTERNAL MEDICINE

## 2025-03-26 LAB
OHS CV AF BURDEN PERCENT: < 1
OHS CV BIV PACING PERCENT: 99.96 %
OHS CV DC REMOTE DEVICE TYPE: NORMAL
OHS CV RV PACING PERCENT: 78.5 %

## 2025-04-14 DIAGNOSIS — I50.22 CHRONIC SYSTOLIC HEART FAILURE: ICD-10-CM

## 2025-04-14 RX ORDER — METOPROLOL SUCCINATE 25 MG/1
25 TABLET, EXTENDED RELEASE ORAL NIGHTLY
Qty: 90 TABLET | Refills: 3 | Status: SHIPPED | OUTPATIENT
Start: 2025-04-14

## 2025-05-28 ENCOUNTER — HOSPITAL ENCOUNTER (OUTPATIENT)
Dept: CARDIOLOGY | Facility: HOSPITAL | Age: 80
Discharge: HOME | End: 2025-05-28
Attending: INTERNAL MEDICINE
Payer: MEDICARE

## 2025-05-28 VITALS
DIASTOLIC BLOOD PRESSURE: 71 MMHG | HEART RATE: 63 BPM | SYSTOLIC BLOOD PRESSURE: 116 MMHG | WEIGHT: 163 LBS | HEIGHT: 70 IN | BODY MASS INDEX: 23.34 KG/M2

## 2025-05-28 DIAGNOSIS — Z95.0 CARDIAC RESYNCHRONIZATION THERAPY PACEMAKER (CRT-P) IN PLACE: ICD-10-CM

## 2025-05-28 DIAGNOSIS — I50.22 CHRONIC SYSTOLIC HEART FAILURE: ICD-10-CM

## 2025-05-28 LAB
AORTIC SIZE INDEX (SOV): 1.5 CM/M2
AORTIC SIZE INDEX: 1.5 CM/M2
ASCENDING AORTA: 2.9 CM
AV AREA BY CONTINUOUS VTI: 3.1 CM2
AV INDEX (PROSTH): 0.8
AV LVOT MEAN GRADIENT: 3 MMHG
AV LVOT PEAK GRADIENT: 5 MMHG
AV MEAN GRADIENT: 5 MMHG
AV PEAK GRADIENT: 9 MMHG
AV REGURGITATION PRESSURE HALF TIME: 495 MS
AV VALVE AREA BY VELOCITY RATIO: 3 CM²
AV VALVE AREA: 3 CM2
AV VELOCITY RATIO: 0.8
BSA FOR ECHO PROCEDURE: 1.91 M2
CV ECHO LV RWT: 0.44 CM
DOP CALC AO PEAK VEL: 1.5 M/S
DOP CALC AO VTI: 31.8 CM
DOP CALC LVOT AREA: 3.8 CM2
DOP CALC LVOT DIAMETER: 2.2 CM
DOP CALC LVOT PEAK VEL: 1.2 M/S
DOP CALC LVOT STROKE VOLUME: 96.9 CM3
DOP CALC RVOT AREA: 3.08 CM2
DOP CALC RVOT DIAMETER: 1.98 CM
DOP CALCLVOT PEAK VEL VTI: 25.5 CM
E WAVE DECELERATION TIME: 199 MS
E/A RATIO: 1.05
E/E' RATIO: 9 M/S
ECHO EF ESTIMATED: 56 %
ECHO LV POSTERIOR WALL: 1 CM (ref 0.6–1.1)
FRACTIONAL SHORTENING: 28.9 % (ref 28–44)
HR MV ECHO: 63 BPM
INTERVENTRICULAR SEPTUM: 0.7 CM (ref 0.6–1.1)
IVC DIAMETER: 1.17 CM
IVRT: 69 MS
LA MAJOR: 6.4 CM
LA MINOR: 6.4 CM
LA WIDTH: 4.6 CM
LEFT ATRIUM SIZE: 4.1 CM
LEFT ATRIUM VOLUME INDEX MOD: 36 ML/M2
LEFT ATRIUM VOLUME INDEX: 54 ML/M2
LEFT ATRIUM VOLUME MOD: 68 ML
LEFT ATRIUM VOLUME: 103 CM3
LEFT INTERNAL DIMENSION IN SYSTOLE: 3.2 CM (ref 2.1–4)
LEFT VENTRICLE DIASTOLIC VOLUME INDEX: 48.69 ML/M2
LEFT VENTRICLE DIASTOLIC VOLUME: 93 ML
LEFT VENTRICLE MASS INDEX: 64.4 G/M2
LEFT VENTRICLE SYSTOLIC VOLUME INDEX: 21.5 ML/M2
LEFT VENTRICLE SYSTOLIC VOLUME: 41 ML
LEFT VENTRICULAR INTERNAL DIMENSION IN DIASTOLE: 4.5 CM (ref 3.5–6)
LEFT VENTRICULAR MASS: 123.1 G
LV LATERAL E/E' RATIO: 7.4
LV SEPTAL E/E' RATIO: 10.1
MR PISA EROA: 0.09 CM2
MV A" WAVE DURATION": 95.15 MS
MV MEAN GRADIENT: 2 MMHG
MV PEAK A VEL: 0.77 M/S
MV PEAK E VEL: 0.81 M/S
MV PEAK GRADIENT: 3 MMHG
OHS CV RV/LV RATIO: 0.76 CM
PISA MRMAX VEL: 5.34 M/S
PISA RADIUS: 0.48 CM
PISA TR MAX VEL: 2.8 M/S
PISA VN NYQUIST: 0.33 M/S
PULM VEIN A" WAVE DURATION": 95.15 MS
PULM VEIN S/D RATIO: 1.33
PULMONIC VEIN PEAK A VELOCITY: 0.2 M/S
PV PEAK D VEL: 0.46 M/S
PV PEAK S VEL: 0.61 M/S
RA MAJOR: 5.88 CM
RA PRESSURE ESTIMATED: 3 MMHG
RA WIDTH: 4.09 CM
RIGHT ATRIAL AREA: 18.9 CM2
RIGHT VENTRICLE DIASTOLIC BASEL DIMENSION: 3.4 CM
RV TB RVSP: 6 MMHG
RV TISSUE DOPPLER FREE WALL SYSTOLIC VELOCITY 1 (APICAL 4 CHAMBER VIEW): 10.96 CM/S
SINUS: 2.83 CM
STJ: 2.7 CM
TDI LATERAL: 0.11 M/S
TDI SEPTAL: 0.08 M/S
TDI: 0.1 M/S
TRICUSPID ANNULAR PLANE SYSTOLIC EXCURSION: 1.8 CM
TV PEAK GRADIENT: 31 MMHG
TV REST PULMONARY ARTERY PRESSURE: 34 MMHG
Z-SCORE OF LEFT VENTRICULAR DIMENSION IN END DIASTOLE: -1.78
Z-SCORE OF LEFT VENTRICULAR DIMENSION IN END SYSTOLE: -0.27

## 2025-05-28 PROCEDURE — 93306 TTE W/DOPPLER COMPLETE: CPT

## 2025-05-28 PROCEDURE — 93306 TTE W/DOPPLER COMPLETE: CPT | Mod: 26,,, | Performed by: INTERNAL MEDICINE

## 2025-05-29 ENCOUNTER — OFFICE VISIT (OUTPATIENT)
Dept: CARDIOLOGY | Facility: CLINIC | Age: 80
End: 2025-05-29
Payer: MEDICARE

## 2025-05-29 VITALS
HEIGHT: 70 IN | BODY MASS INDEX: 23.29 KG/M2 | DIASTOLIC BLOOD PRESSURE: 69 MMHG | HEART RATE: 73 BPM | SYSTOLIC BLOOD PRESSURE: 103 MMHG | WEIGHT: 162.69 LBS

## 2025-05-29 DIAGNOSIS — I49.8 LEFT VENTRICULAR DYSSYNCHRONY: ICD-10-CM

## 2025-05-29 DIAGNOSIS — I12.9 BENIGN HYPERTENSION WITH CHRONIC KIDNEY DISEASE: ICD-10-CM

## 2025-05-29 DIAGNOSIS — E78.2 MIXED HYPERLIPIDEMIA: ICD-10-CM

## 2025-05-29 DIAGNOSIS — I25.10 CORONARY ARTERY DISEASE INVOLVING NATIVE CORONARY ARTERY OF NATIVE HEART WITHOUT ANGINA PECTORIS: Primary | ICD-10-CM

## 2025-05-29 DIAGNOSIS — I44.2 ATRIOVENTRICULAR BLOCK, COMPLETE: ICD-10-CM

## 2025-05-29 DIAGNOSIS — I44.7 LBBB (LEFT BUNDLE BRANCH BLOCK): ICD-10-CM

## 2025-05-29 DIAGNOSIS — I50.22 CHRONIC SYSTOLIC HEART FAILURE: ICD-10-CM

## 2025-05-29 DIAGNOSIS — Z95.0 CARDIAC RESYNCHRONIZATION THERAPY PACEMAKER (CRT-P) IN PLACE: ICD-10-CM

## 2025-05-29 PROCEDURE — 1101F PT FALLS ASSESS-DOCD LE1/YR: CPT | Mod: CPTII,S$GLB,, | Performed by: INTERNAL MEDICINE

## 2025-05-29 PROCEDURE — 99214 OFFICE O/P EST MOD 30 MIN: CPT | Mod: S$GLB,,, | Performed by: INTERNAL MEDICINE

## 2025-05-29 PROCEDURE — 3078F DIAST BP <80 MM HG: CPT | Mod: CPTII,S$GLB,, | Performed by: INTERNAL MEDICINE

## 2025-05-29 PROCEDURE — 3074F SYST BP LT 130 MM HG: CPT | Mod: CPTII,S$GLB,, | Performed by: INTERNAL MEDICINE

## 2025-05-29 PROCEDURE — 99999 PR PBB SHADOW E&M-EST. PATIENT-LVL III: CPT | Mod: PBBFAC,,, | Performed by: INTERNAL MEDICINE

## 2025-05-29 PROCEDURE — 1126F AMNT PAIN NOTED NONE PRSNT: CPT | Mod: CPTII,S$GLB,, | Performed by: INTERNAL MEDICINE

## 2025-05-29 PROCEDURE — 1159F MED LIST DOCD IN RCRD: CPT | Mod: CPTII,S$GLB,, | Performed by: INTERNAL MEDICINE

## 2025-05-29 PROCEDURE — 3288F FALL RISK ASSESSMENT DOCD: CPT | Mod: CPTII,S$GLB,, | Performed by: INTERNAL MEDICINE

## 2025-05-29 NOTE — PROGRESS NOTES
Subjective:   05/29/2025     Patient ID:  Ilene Mora is a 80 y.o. female who presents for evaulation of Congestive Heart Failure, Coronary Artery Disease, and Hyperlipidemia       History of Present Illness    CHIEF COMPLAINT:  Patient presents for follow-up after a syncope episode, with ongoing concerns about low BP and associated symptoms.    HPI:  Patient was previously seen for syncope and diagnosed with symptomatic hypotension, leading to the discontinuation of Jardiance. She reports ongoing dizziness and awareness of BP dropping, which causes interruption of activities. She reports low BP readings, with some as low as 87 systolic, which according to the device, should prompt immediate medical attention. She acknowledges disregarding these warnings. She reports general malaise. On the day of the visit, she did not take morning medications.    She denies swelling, chest pain, dyspnea, history of MI or stent placement.    CARDIAC HISTORY:  Echo: EF 60-65%, no other anomalies CAD Chronic systolic HF  CRT pacemaker  Pacemaker placed 2014  Pacemaker battery changed at [facility], additional lead added, 6 years left on battery    MEDICATIONS:  Entresto 26 mg BID for chronic systolic heart failure  Metoprolol 25 mg nightly for HTN  Atorvastatin 40 mg daily for hypercholesterolemia  Jardiance (discontinued due to symptomatic hypotension)    TEST RESULTS:  Patient underwent a lipid profile 5 months ago, which revealed a total cholesterol of 153, HDL of 67, triglycerides of 93, and LDL of 68. Her CBC and metabolic profile were both unremarkable.    MEDICAL HISTORY:  Patient has a history of syncope and symptomatic hypotension.      ROS:  General: -fever, -chills, +fatigue, -weight gain, -weight loss  Eyes: -vision changes, -redness, -discharge  ENT: -ear pain, -nasal congestion, -sore throat  Cardiovascular: -chest pain, -palpitations, -lower extremity edema  Respiratory: -cough, -shortness of  "breath  Gastrointestinal: -abdominal pain, -nausea, -vomiting, -diarrhea, -constipation, -blood in stool  Genitourinary: -dysuria, -hematuria, -frequency  Musculoskeletal: -joint pain, -muscle pain  Skin: -rash, -lesion  Neurological: -headache, +dizziness, -numbness, -tingling, +lightheadedness  Psychiatric: -anxiety, -depression, -sleep difficulty          Problem List[1]     Review of patient's allergies indicates:   Allergen Reactions    Sulfadiazine      Other reaction(s): flushed, swelling       Current Medications[2]     Objective:   Physical Exam    General: No acute distress. Well-developed. Well-nourished.  Eyes: EOMI. Sclerae anicteric.  HENT: Normocephalic. Atraumatic. Nares patent. Moist oral mucosa.  Cardiovascular: Regular rate. Regular rhythm. No murmurs. No rubs. No gallops. Normal S1, S2.  Respiratory: Normal respiratory effort. Clear to auscultation bilaterally. No rales. No rhonchi. No wheezing.  Musculoskeletal: No  obvious deformity.  Extremities: No lower extremity edema.  Neurological: Alert & oriented x3. No slurred speech. Normal gait.  Psychiatric: Normal mood. Normal affect. Good insight. Good judgment.  Skin: Warm. Dry. No rash.          Vitals:    05/29/25 0949   BP: 103/69   Pulse: 73     Wt Readings from Last 3 Encounters:   05/29/25 73.8 kg (162 lb 11.2 oz)   05/28/25 73.9 kg (163 lb)   02/27/25 74.3 kg (163 lb 12.8 oz)     Temp Readings from Last 3 Encounters:   No data found for Temp     BP Readings from Last 3 Encounters:   05/29/25 103/69   05/28/25 116/71   02/27/25 124/77     Pulse Readings from Last 3 Encounters:   05/29/25 73   05/28/25 63   02/27/25 66               No results found for: "CHOL"  No results found for: "HDL"  No results found for: "LDLCALC"  Lab Results   Component Value Date    ALT 15 12/11/2024    AST 17 12/11/2024    AST 18 06/20/2024    AST 20 02/21/2024     Lab Results   Component Value Date    CREATININE 0.87 12/11/2024    BUN 14 12/11/2024     " "12/11/2024    K 4.4 12/11/2024    CO2 29 12/11/2024    CO2 30 06/20/2024    CO2 27 02/21/2024     No results found for: "HGB", "HCT"    Assessment and Plan:   Assessment & Plan    I50.22 Chronic systolic heart failure  I44.2 Atrioventricular block, complete  I25.10 Coronary artery disease involving native coronary artery of native heart without angina pectoris  Z95.0 Cardiac resynchronization therapy pacemaker (CRT-P) in place  I12.9 Benign hypertension with chronic kidney disease  E78.2 Mixed hyperlipidemia  I44.7 LBBB (left bundle branch block)  I49.8 Left ventricular dyssynchrony    IMPRESSION:  - Patient presents with symptomatic hypotension, likely due to current medication regimen for chronic systolic heart failure.  - Recent echocardiogram shows improved EF of 60-65%.  - Reduced dosage of both Entresto and metoprolol to address low blood pressure symptoms while maintaining heart failure management.    CHRONIC SYSTOLIC HEART FAILURE:  - Discussed the importance of maintaining well-controlled blood pressure while avoiding symptomatic hypotension.  - Decreased Entresto to half a pill twice daily.  - Decreased metoprolol to half a pill daily at night.  - Contact the office if blood pressure remains low after medication adjustments.  - Follow up in 6 months.    BENIGN HYPERTENSION WITH CHRONIC KIDNEY DISEASE:  - Discussed the importance of maintaining well-controlled blood pressure while avoiding symptomatic hypotension.  - Contact the office if blood pressure remains low after medication adjustments.    MIXED HYPERLIPIDEMIA:  - Continued atorvastatin 40 mg daily.          Follow up in about 6 months (around 11/29/2025).        No future appointments.      This note was generated with the assistance of ambient listening technology. Verbal consent was obtained by the patient and accompanying visitor(s) for the recording of patient appointment to facilitate this note. I attest to having reviewed and edited the " generated note for accuracy, though some syntax or spelling errors may persist. Please contact the author of this note for any clarification.                      [1]   Patient Active Problem List  Diagnosis    Macular degeneration    Cardiac resynchronization therapy pacemaker (CRT-P) in place    Allergic rhinitis    Hyperlipidemia    Osteoarthritis of hand    Osteoporosis    Trigeminal neuralgia    Benign hypertension with chronic kidney disease    Left ventricular dyssynchrony    Chronic systolic heart failure    CKD (chronic kidney disease) stage 2, GFR 60-89 ml/min    Coronary artery disease involving native coronary artery of native heart without angina pectoris    Diastolic dysfunction    Heart block    Insomnia    LBBB (left bundle branch block)    Mitral valve insufficiency    Atrioventricular block, complete   [2]   Current Outpatient Medications:     atorvastatin (LIPITOR) 40 MG tablet, Take 40 mg by mouth once daily., Disp: , Rfl:     calcium carbonate/vitamin D3 (VITAMIN D-3 ORAL), Take by mouth Daily., Disp: , Rfl:     metoprolol succinate (TOPROL-XL) 25 MG 24 hr tablet, TAKE 1 TABLET EVERY EVENING, Disp: 90 tablet, Rfl: 3    sacubitriL-valsartan (ENTRESTO) 24-26 mg per tablet, Take 1 tablet by mouth 2 (two) times daily., Disp: 60 tablet, Rfl: 11    vit A/vit C/vit E/zinc/copper (ICAPS AREDS ORAL), Take by mouth Daily., Disp: , Rfl:     ibandronate (BONIVA) 150 mg tablet, Take 150 mg by mouth every 30 days. (Patient not taking: Reported on 2/27/2025), Disp: , Rfl:

## 2025-06-10 ENCOUNTER — CLINICAL SUPPORT (OUTPATIENT)
Dept: CARDIOLOGY | Facility: HOSPITAL | Age: 80
End: 2025-06-10
Payer: MEDICARE

## 2025-06-10 ENCOUNTER — CLINICAL SUPPORT (OUTPATIENT)
Dept: CARDIOLOGY | Facility: HOSPITAL | Age: 80
End: 2025-06-10
Attending: INTERNAL MEDICINE
Payer: MEDICARE

## 2025-06-10 DIAGNOSIS — I44.2 ATRIOVENTRICULAR BLOCK, COMPLETE: ICD-10-CM

## 2025-06-10 DIAGNOSIS — Z95.0 PRESENCE OF CARDIAC PACEMAKER: ICD-10-CM

## 2025-06-10 PROCEDURE — 93296 REM INTERROG EVL PM/IDS: CPT | Performed by: INTERNAL MEDICINE

## 2025-06-10 PROCEDURE — 93294 REM INTERROG EVL PM/LDLS PM: CPT | Mod: ,,, | Performed by: INTERNAL MEDICINE

## 2025-06-20 LAB
OHS CV AF BURDEN PERCENT: < 1
OHS CV BIV PACING PERCENT: 100 %
OHS CV DC REMOTE DEVICE TYPE: NORMAL
OHS CV RV PACING PERCENT: 100 %

## 2025-08-06 RX ORDER — SACUBITRIL AND VALSARTAN 24; 26 MG/1; MG/1
1 TABLET, FILM COATED ORAL 2 TIMES DAILY
Qty: 180 TABLET | Refills: 3 | Status: SHIPPED | OUTPATIENT
Start: 2025-08-06

## 2025-09-04 ENCOUNTER — TELEPHONE (OUTPATIENT)
Dept: CARDIOLOGY | Facility: CLINIC | Age: 80
End: 2025-09-04
Payer: MEDICARE